# Patient Record
Sex: FEMALE | Race: WHITE | NOT HISPANIC OR LATINO | Employment: OTHER | URBAN - METROPOLITAN AREA
[De-identification: names, ages, dates, MRNs, and addresses within clinical notes are randomized per-mention and may not be internally consistent; named-entity substitution may affect disease eponyms.]

---

## 2023-05-17 PROBLEM — M81.0 OSTEOPOROSIS: Status: ACTIVE | Noted: 2023-05-17

## 2023-05-17 PROBLEM — J45.909 ASTHMA (HHS-HCC): Status: ACTIVE | Noted: 2023-05-17

## 2023-05-17 PROBLEM — H26.9 CATARACT: Status: ACTIVE | Noted: 2023-05-17

## 2023-05-17 PROBLEM — I25.10 CAD (CORONARY ARTERY DISEASE): Status: ACTIVE | Noted: 2023-05-17

## 2023-05-17 PROBLEM — E03.9 HYPOTHYROIDISM: Status: ACTIVE | Noted: 2023-05-17

## 2023-05-17 PROBLEM — I42.2 HYPERTROPHIC CARDIOMYOPATHY (MULTI): Status: ACTIVE | Noted: 2023-05-17

## 2023-05-17 PROBLEM — R73.01 ELEVATED FASTING GLUCOSE: Status: ACTIVE | Noted: 2023-05-17

## 2023-05-17 PROBLEM — K21.9 ACID REFLUX: Status: ACTIVE | Noted: 2023-05-17

## 2023-05-17 PROBLEM — S62.102A CLOSED FRACTURE OF LEFT WRIST: Status: RESOLVED | Noted: 2023-05-17 | Resolved: 2023-05-17

## 2023-05-17 PROBLEM — M25.632 STIFFNESS OF LEFT WRIST, NOT ELSEWHERE CLASSIFIED: Status: ACTIVE | Noted: 2023-05-17

## 2023-05-17 PROBLEM — E78.5 HYPERLIPIDEMIA: Status: ACTIVE | Noted: 2023-05-17

## 2023-05-17 PROBLEM — I10 HTN (HYPERTENSION): Status: ACTIVE | Noted: 2023-05-17

## 2023-05-17 PROBLEM — R73.03 PREDIABETES: Status: ACTIVE | Noted: 2023-05-17

## 2023-05-17 PROBLEM — J44.9 COPD (CHRONIC OBSTRUCTIVE PULMONARY DISEASE) (MULTI): Status: ACTIVE | Noted: 2023-05-17

## 2023-05-17 RX ORDER — MULTIVIT-MIN/IRON/FOLIC ACID/K 18-600-40
1 CAPSULE ORAL DAILY
COMMUNITY
Start: 2022-05-24

## 2023-05-17 RX ORDER — AMLODIPINE BESYLATE 10 MG/1
1 TABLET ORAL DAILY
COMMUNITY
Start: 2022-05-24 | End: 2023-06-19

## 2023-05-17 RX ORDER — ALENDRONATE SODIUM 70 MG/1
TABLET ORAL WEEKLY
COMMUNITY
Start: 2022-05-24

## 2023-05-17 RX ORDER — ATORVASTATIN CALCIUM 40 MG/1
1 TABLET, FILM COATED ORAL DAILY
COMMUNITY
Start: 2022-05-24 | End: 2023-06-19

## 2023-05-17 RX ORDER — METOPROLOL SUCCINATE 100 MG/1
1 TABLET, EXTENDED RELEASE ORAL DAILY
COMMUNITY
Start: 2022-05-24 | End: 2023-06-19

## 2023-05-17 RX ORDER — ACETAMINOPHEN 500 MG
50 TABLET ORAL DAILY
COMMUNITY
Start: 2022-05-24

## 2023-05-17 RX ORDER — NITROGLYCERIN 0.4 MG/1
TABLET SUBLINGUAL
COMMUNITY
Start: 2022-05-24

## 2023-05-17 RX ORDER — ZINC GLUCONATE 50 MG
1 TABLET ORAL DAILY
COMMUNITY
Start: 2022-05-24

## 2023-05-17 RX ORDER — ALBUTEROL SULFATE 90 UG/1
AEROSOL, METERED RESPIRATORY (INHALATION)
COMMUNITY
Start: 2022-05-24 | End: 2023-12-07 | Stop reason: SDUPTHER

## 2023-05-17 RX ORDER — HYDROCHLOROTHIAZIDE 25 MG/1
1 TABLET ORAL DAILY
COMMUNITY
Start: 2022-05-24

## 2023-05-17 RX ORDER — LEVOTHYROXINE SODIUM 50 UG/1
1 TABLET ORAL DAILY
COMMUNITY
Start: 2022-05-24 | End: 2023-10-20 | Stop reason: SDUPTHER

## 2023-05-17 RX ORDER — ASPIRIN 81 MG/1
1 TABLET ORAL DAILY
COMMUNITY
Start: 2022-05-24

## 2023-05-17 RX ORDER — POTASSIUM CHLORIDE 750 MG/1
1 TABLET, FILM COATED, EXTENDED RELEASE ORAL DAILY
COMMUNITY
Start: 2022-05-24 | End: 2023-06-08 | Stop reason: SDUPTHER

## 2023-05-17 RX ORDER — OMEPRAZOLE 20 MG/1
1 CAPSULE, DELAYED RELEASE ORAL DAILY
COMMUNITY
Start: 2022-05-24

## 2023-06-01 ENCOUNTER — OFFICE VISIT (OUTPATIENT)
Dept: PRIMARY CARE | Facility: CLINIC | Age: 83
End: 2023-06-01
Payer: MEDICARE

## 2023-06-01 VITALS
HEIGHT: 63 IN | WEIGHT: 164 LBS | SYSTOLIC BLOOD PRESSURE: 124 MMHG | RESPIRATION RATE: 20 BRPM | OXYGEN SATURATION: 90 % | BODY MASS INDEX: 29.06 KG/M2 | HEART RATE: 85 BPM | DIASTOLIC BLOOD PRESSURE: 60 MMHG

## 2023-06-01 DIAGNOSIS — E78.2 MIXED HYPERLIPIDEMIA: ICD-10-CM

## 2023-06-01 DIAGNOSIS — Z00.00 ROUTINE GENERAL MEDICAL EXAMINATION AT HEALTH CARE FACILITY: Primary | ICD-10-CM

## 2023-06-01 DIAGNOSIS — E03.9 HYPOTHYROIDISM, UNSPECIFIED TYPE: ICD-10-CM

## 2023-06-01 DIAGNOSIS — Z13.29 SCREENING FOR HYPOTHYROIDISM: ICD-10-CM

## 2023-06-01 DIAGNOSIS — I10 BENIGN ESSENTIAL HTN: ICD-10-CM

## 2023-06-01 DIAGNOSIS — I42.2 HYPERTROPHIC CARDIOMYOPATHY (MULTI): ICD-10-CM

## 2023-06-01 DIAGNOSIS — E53.8 VITAMIN B 12 DEFICIENCY: ICD-10-CM

## 2023-06-01 DIAGNOSIS — F17.210 CIGARETTE SMOKER: ICD-10-CM

## 2023-06-01 DIAGNOSIS — R73.03 PREDIABETES: ICD-10-CM

## 2023-06-01 DIAGNOSIS — F51.01 PRIMARY INSOMNIA: ICD-10-CM

## 2023-06-01 DIAGNOSIS — J44.9 CHRONIC OBSTRUCTIVE PULMONARY DISEASE, UNSPECIFIED COPD TYPE (MULTI): ICD-10-CM

## 2023-06-01 DIAGNOSIS — E55.9 VITAMIN D DEFICIENCY: ICD-10-CM

## 2023-06-01 DIAGNOSIS — R73.01 ELEVATED FASTING GLUCOSE: ICD-10-CM

## 2023-06-01 PROBLEM — F41.9 ANXIETY: Status: ACTIVE | Noted: 2023-06-01

## 2023-06-01 PROBLEM — E03.8 OTHER SPECIFIED HYPOTHYROIDISM: Status: ACTIVE | Noted: 2021-11-24

## 2023-06-01 PROBLEM — E03.8 OTHER SPECIFIED HYPOTHYROIDISM: Status: RESOLVED | Noted: 2021-11-24 | Resolved: 2023-06-01

## 2023-06-01 PROCEDURE — 3074F SYST BP LT 130 MM HG: CPT

## 2023-06-01 PROCEDURE — 1159F MED LIST DOCD IN RCRD: CPT

## 2023-06-01 PROCEDURE — 1160F RVW MEDS BY RX/DR IN RCRD: CPT

## 2023-06-01 PROCEDURE — 1170F FXNL STATUS ASSESSED: CPT

## 2023-06-01 PROCEDURE — 99406 BEHAV CHNG SMOKING 3-10 MIN: CPT

## 2023-06-01 PROCEDURE — 99214 OFFICE O/P EST MOD 30 MIN: CPT

## 2023-06-01 PROCEDURE — 3078F DIAST BP <80 MM HG: CPT

## 2023-06-01 PROCEDURE — G0439 PPPS, SUBSEQ VISIT: HCPCS

## 2023-06-01 PROCEDURE — 4004F PT TOBACCO SCREEN RCVD TLK: CPT

## 2023-06-01 RX ORDER — DIPHENHYDRAMINE HCL 50 MG
50 CAPSULE ORAL NIGHTLY PRN
Qty: 90 CAPSULE | Refills: 0 | Status: SHIPPED | OUTPATIENT
Start: 2023-06-01 | End: 2023-08-30

## 2023-06-01 ASSESSMENT — PATIENT HEALTH QUESTIONNAIRE - PHQ9
2. FEELING DOWN, DEPRESSED OR HOPELESS: NOT AT ALL
1. LITTLE INTEREST OR PLEASURE IN DOING THINGS: NOT AT ALL
SUM OF ALL RESPONSES TO PHQ9 QUESTIONS 1 AND 2: 0

## 2023-06-01 ASSESSMENT — ENCOUNTER SYMPTOMS
CONSTITUTIONAL NEGATIVE: 1
PSYCHIATRIC NEGATIVE: 1
HEMATOLOGIC/LYMPHATIC NEGATIVE: 1
MUSCULOSKELETAL NEGATIVE: 1
EYES NEGATIVE: 1
GASTROINTESTINAL NEGATIVE: 1
CARDIOVASCULAR NEGATIVE: 1
ALLERGIC/IMMUNOLOGIC NEGATIVE: 1
RESPIRATORY NEGATIVE: 1
NEUROLOGICAL NEGATIVE: 1
ENDOCRINE NEGATIVE: 1

## 2023-06-01 ASSESSMENT — ACTIVITIES OF DAILY LIVING (ADL)
DRESSING: INDEPENDENT
DOING_HOUSEWORK: INDEPENDENT
GROCERY_SHOPPING: INDEPENDENT
MANAGING_FINANCES: INDEPENDENT
BATHING: INDEPENDENT
TAKING_MEDICATION: INDEPENDENT

## 2023-06-01 NOTE — PROGRESS NOTES
Subjective   Reason for Visit: Shirley Newberry is an 82 y.o. female here for a Medicare Wellness visit.     Past Medical, Surgical, and Family History reviewed and updated in chart.    Reviewed all medications by prescribing practitioner or clinical pharmacist (such as prescriptions, OTCs, herbal therapies and supplements) and documented in the medical record.    HPI an 82-year-old female with past medical history of cardiovascular disease, COPD due to current cigarette smoking about 3 cigarettes a day, hypertension, hypertrophic cardiomyopathy, osteoporosis, vitamin deficiency, prediabetes, hypothyroidism, and hyperlipidemia arrives to the clinic with chief complaint of Medicare wellness exam.  At her last appointment, she was given directions to complete blood work.  She was also given a prescription for hydroxyzine 25 mg oral tablet daily for anxiety and insomnia.  She reports that the hydroxyzine has not worked and that she was unaware of completing blood work.  She recently saw her cardiologist for chest pain at the [facility].  An echocardiogram was completed that showed an ejection fraction of 84% with no acute ischemia.  She states that her chest pain has gone away since then.  Other than this, the patient denies any chest pain, shortness of breath, diarrhea, fevers, chills, head pain, COVID-like symptoms.    Patient Care Team:  DEMETRIO Mcqueen-RADHA as PCP - General  Uday Clay DO as PCP - [employer] PCP     Review of Systems   Constitutional: Negative.    HENT: Negative.     Eyes: Negative.    Respiratory: Negative.     Cardiovascular: Negative.    Gastrointestinal: Negative.    Endocrine: Negative.    Genitourinary: Negative.    Musculoskeletal: Negative.    Skin: Negative.    Allergic/Immunologic: Negative.    Neurological: Negative.    Hematological: Negative.    Psychiatric/Behavioral: Negative.     All other systems reviewed and are negative.      Objective  "  Vitals:  /60 (BP Location: Right arm, BP Cuff Size: Large adult)   Pulse 85   Resp 20   Ht 1.6 m (5' 3\")   Wt 74.4 kg (164 lb)   SpO2 90%   BMI 29.05 kg/m²       Physical Exam  Vitals and nursing note reviewed.   Constitutional:       Appearance: Normal appearance.   HENT:      Head: Normocephalic and atraumatic.      Right Ear: Tympanic membrane normal.      Left Ear: Tympanic membrane normal.      Nose: Nose normal.      Mouth/Throat:      Mouth: Mucous membranes are moist.      Pharynx: Oropharynx is clear.   Eyes:      Extraocular Movements: Extraocular movements intact.      Conjunctiva/sclera: Conjunctivae normal.      Pupils: Pupils are equal, round, and reactive to light.   Cardiovascular:      Rate and Rhythm: Normal rate and regular rhythm.   Pulmonary:      Effort: Pulmonary effort is normal.      Breath sounds: Normal breath sounds.   Abdominal:      General: Bowel sounds are normal.      Palpations: Abdomen is soft.   Musculoskeletal:         General: Normal range of motion.      Cervical back: Normal range of motion and neck supple.   Skin:     General: Skin is warm.      Capillary Refill: Capillary refill takes less than 2 seconds.   Neurological:      General: No focal deficit present.      Mental Status: She is alert and oriented to person, place, and time. Mental status is at baseline.   Psychiatric:         Mood and Affect: Mood normal.         Behavior: Behavior normal.         Thought Content: Thought content normal.         Judgment: Judgment normal.         Assessment/Plan   Problem List Items Addressed This Visit          Respiratory    COPD (chronic obstructive pulmonary disease) (CMS/HCC)    Relevant Orders    Follow Up In [facility] - PCP       Circulatory    Hypertrophic cardiomyopathy (CMS/HCC)       Other    Elevated fasting glucose    Relevant Orders    Comprehensive Metabolic Panel    CBC    Hemoglobin A1C    Follow Up In [facility] - PCP    " Hyperlipidemia    Relevant Orders    Lipid Panel    Follow Up In [facility] - PCP     Other Visit Diagnoses       Routine general medical examination at health care facility    -  Primary    Relevant Orders    Follow Up In [facility] - PCP    Vitamin B 12 deficiency        Relevant Orders    Vitamin D, Total    Follow Up In [facility] - PCP    Vitamin D deficiency        Relevant Orders    Vitamin D, Total    Vitamin B12    Follow Up In [facility] - PCP    Screening for hypothyroidism        Relevant Orders    TSH with reflex to Free T4 if abnormal    Follow Up In [facility] - PCP    Primary insomnia        Relevant Medications    diphenhydrAMINE (Unisom SleepGels) 50 mg capsule    Other Relevant Orders    Follow Up In [facility] - PCP    Cigarette smoker        Relevant Orders    Follow Up In [facility] - PCP            It was a pleasure seeing you in the office today.    Your blood pressure is controlled.  He has no recurrent symptoms of chest pain.  I do recommend that you follow-up with cardiologist at the [facility] for further evaluation and health maintenance.    In regards to your insomnia: The medication that you are referring to that your neighbor takes is a controlled sleep aid.  This is a highly addictive medication.  I do believe that you do not need this medication given your past medical history and current medication regimen.  I did send over the counter Unisom to your pharmacy.  Please let us know if this works.    I did order blood work for you to complete prior to your next appointment.  These labs require you to fast.  Your next appointment will be in 3 months.    You have no additional questions in regards to your daily medications.    We have discussed screening exams in the office today.  You are not interested in the CT cardiac scoring or the low-dose CT of the lungs.  Bone density scan was completed last year.   Repeat bone density in 2024.  Digital mammogram was completed beginning of this year.  Repeat mammogram in 1 year.    Cigarette smoking cessation counselling for 4 minutes. Patient will focus on stopping  to help decrease risk of developing cancers, respiratory, and cardiovascular diseases.    Anticipatory guidance, age appropriate vaccines, screening exams, health promotion and prevention discussed.    I also advised you to follow low fat diet and exercise for at least 30 minutes daily.    This document was generated using the assistance of voice recognition software. If there are any errors of spelling, grammar, syntax, or meaning; please feel free to contact me directly for clarification.

## 2023-06-01 NOTE — PROGRESS NOTES
Medicare well exam.    The med you gave her for sleeping / anxiety - does not work. I do not see the med on her chart and she does not know the name of it. She is asking to try belsomra or something like that.

## 2023-06-02 ENCOUNTER — TELEPHONE (OUTPATIENT)
Dept: PRIMARY CARE | Facility: CLINIC | Age: 83
End: 2023-06-02
Payer: MEDICARE

## 2023-06-02 NOTE — TELEPHONE ENCOUNTER
Patient called in stating that she was on a plan for 18 hrs and he ears fill plugged. Patient wants advise on what she can do her self to help pop her ears.

## 2023-06-07 ENCOUNTER — TELEPHONE (OUTPATIENT)
Dept: PRIMARY CARE | Facility: CLINIC | Age: 83
End: 2023-06-07

## 2023-06-07 ENCOUNTER — LAB (OUTPATIENT)
Dept: LAB | Facility: LAB | Age: 83
End: 2023-06-07
Payer: MEDICARE

## 2023-06-07 DIAGNOSIS — E78.2 MIXED HYPERLIPIDEMIA: ICD-10-CM

## 2023-06-07 DIAGNOSIS — E87.6 HYPOKALEMIA: Primary | ICD-10-CM

## 2023-06-07 DIAGNOSIS — Z13.29 SCREENING FOR HYPOTHYROIDISM: ICD-10-CM

## 2023-06-07 DIAGNOSIS — E55.9 VITAMIN D DEFICIENCY: ICD-10-CM

## 2023-06-07 DIAGNOSIS — R73.01 ELEVATED FASTING GLUCOSE: ICD-10-CM

## 2023-06-07 DIAGNOSIS — E53.8 VITAMIN B 12 DEFICIENCY: ICD-10-CM

## 2023-06-07 LAB
ALANINE AMINOTRANSFERASE (SGPT) (U/L) IN SER/PLAS: 22 U/L (ref 7–45)
ALBUMIN (G/DL) IN SER/PLAS: 3.7 G/DL (ref 3.4–5)
ALKALINE PHOSPHATASE (U/L) IN SER/PLAS: 73 U/L (ref 33–136)
ANION GAP IN SER/PLAS: 11 MMOL/L (ref 10–20)
ASPARTATE AMINOTRANSFERASE (SGOT) (U/L) IN SER/PLAS: 22 U/L (ref 9–39)
BILIRUBIN TOTAL (MG/DL) IN SER/PLAS: 0.6 MG/DL (ref 0–1.2)
CALCIDIOL (25 OH VITAMIN D3) (NG/ML) IN SER/PLAS: 49 NG/ML
CALCIUM (MG/DL) IN SER/PLAS: 9.2 MG/DL (ref 8.6–10.3)
CARBON DIOXIDE, TOTAL (MMOL/L) IN SER/PLAS: 31 MMOL/L (ref 21–32)
CHLORIDE (MMOL/L) IN SER/PLAS: 103 MMOL/L (ref 98–107)
CHOLESTEROL (MG/DL) IN SER/PLAS: 113 MG/DL (ref 0–199)
CHOLESTEROL IN HDL (MG/DL) IN SER/PLAS: 26.4 MG/DL
CHOLESTEROL/HDL RATIO: 4.3
COBALAMIN (VITAMIN B12) (PG/ML) IN SER/PLAS: 587 PG/ML (ref 211–911)
CREATININE (MG/DL) IN SER/PLAS: 0.86 MG/DL (ref 0.5–1.05)
ERYTHROCYTE DISTRIBUTION WIDTH (RATIO) BY AUTOMATED COUNT: 14.1 % (ref 11.5–14.5)
ERYTHROCYTE MEAN CORPUSCULAR HEMOGLOBIN CONCENTRATION (G/DL) BY AUTOMATED: 32.1 G/DL (ref 32–36)
ERYTHROCYTE MEAN CORPUSCULAR VOLUME (FL) BY AUTOMATED COUNT: 92 FL (ref 80–100)
ERYTHROCYTES (10*6/UL) IN BLOOD BY AUTOMATED COUNT: 5.19 X10E12/L (ref 4–5.2)
ESTIMATED AVERAGE GLUCOSE FOR HBA1C: 131 MG/DL
GFR FEMALE: 68 ML/MIN/1.73M2
GLUCOSE (MG/DL) IN SER/PLAS: 95 MG/DL (ref 74–99)
HEMATOCRIT (%) IN BLOOD BY AUTOMATED COUNT: 47.7 % (ref 36–46)
HEMOGLOBIN (G/DL) IN BLOOD: 15.3 G/DL (ref 12–16)
HEMOGLOBIN A1C/HEMOGLOBIN TOTAL IN BLOOD: 6.2 %
LDL: 56 MG/DL (ref 0–99)
LEUKOCYTES (10*3/UL) IN BLOOD BY AUTOMATED COUNT: 9 X10E9/L (ref 4.4–11.3)
PLATELETS (10*3/UL) IN BLOOD AUTOMATED COUNT: 215 X10E9/L (ref 150–450)
POTASSIUM (MMOL/L) IN SER/PLAS: 3.3 MMOL/L (ref 3.5–5.3)
PROTEIN TOTAL: 6.2 G/DL (ref 6.4–8.2)
SODIUM (MMOL/L) IN SER/PLAS: 142 MMOL/L (ref 136–145)
THYROTROPIN (MIU/L) IN SER/PLAS BY DETECTION LIMIT <= 0.05 MIU/L: 2.57 MIU/L (ref 0.44–3.98)
TRIGLYCERIDE (MG/DL) IN SER/PLAS: 154 MG/DL (ref 0–149)
UREA NITROGEN (MG/DL) IN SER/PLAS: 15 MG/DL (ref 6–23)
VLDL: 31 MG/DL (ref 0–40)

## 2023-06-07 PROCEDURE — 82306 VITAMIN D 25 HYDROXY: CPT

## 2023-06-07 PROCEDURE — 80061 LIPID PANEL: CPT

## 2023-06-07 PROCEDURE — 82607 VITAMIN B-12: CPT

## 2023-06-07 PROCEDURE — 83036 HEMOGLOBIN GLYCOSYLATED A1C: CPT

## 2023-06-07 PROCEDURE — 36415 COLL VENOUS BLD VENIPUNCTURE: CPT

## 2023-06-07 PROCEDURE — 80053 COMPREHEN METABOLIC PANEL: CPT

## 2023-06-07 PROCEDURE — 84443 ASSAY THYROID STIM HORMONE: CPT

## 2023-06-07 PROCEDURE — 85027 COMPLETE CBC AUTOMATED: CPT

## 2023-06-07 NOTE — TELEPHONE ENCOUNTER
Please notify the patient that he potassium is at 3.3 which is low. It looks like she is on potassium 10meq daily. If she is taking that daily, tell her to increase it to 20meq daily. If she is not taking any potassium 10meq daily, let me know and I will send in that rx. Repeat CMP blood order 1 week before I see her again. thanks

## 2023-06-08 RX ORDER — POTASSIUM CHLORIDE 750 MG/1
10 TABLET, FILM COATED, EXTENDED RELEASE ORAL DAILY
Qty: 90 TABLET | Refills: 0 | Status: SHIPPED | OUTPATIENT
Start: 2023-06-08 | End: 2023-08-10 | Stop reason: SDUPTHER

## 2023-06-08 NOTE — TELEPHONE ENCOUNTER
Shirley called back--She is not taking any Potassium right now --she needs refill sent in   She wants it to go to [facility]  mail order pharmacy

## 2023-06-19 DIAGNOSIS — E78.2 MIXED HYPERLIPIDEMIA: Primary | ICD-10-CM

## 2023-06-19 DIAGNOSIS — F41.9 ANXIETY: ICD-10-CM

## 2023-06-19 DIAGNOSIS — I10 BENIGN ESSENTIAL HTN: ICD-10-CM

## 2023-06-19 RX ORDER — AMLODIPINE BESYLATE 10 MG/1
TABLET ORAL
Qty: 90 TABLET | Refills: 2 | Status: SHIPPED | OUTPATIENT
Start: 2023-06-19

## 2023-06-19 RX ORDER — METOPROLOL SUCCINATE 100 MG/1
TABLET, EXTENDED RELEASE ORAL
Qty: 90 TABLET | Refills: 2 | Status: SHIPPED | OUTPATIENT
Start: 2023-06-19

## 2023-06-19 RX ORDER — ATORVASTATIN CALCIUM 40 MG/1
TABLET, FILM COATED ORAL
Qty: 90 TABLET | Refills: 2 | Status: SHIPPED | OUTPATIENT
Start: 2023-06-19

## 2023-06-19 RX ORDER — HYDROXYZINE HYDROCHLORIDE 25 MG/1
TABLET, FILM COATED ORAL
Qty: 90 TABLET | Refills: 2 | Status: SHIPPED | OUTPATIENT
Start: 2023-06-19

## 2023-07-26 ENCOUNTER — TELEPHONE (OUTPATIENT)
Dept: PRIMARY CARE | Facility: CLINIC | Age: 83
End: 2023-07-26
Payer: MEDICARE

## 2023-07-26 NOTE — TELEPHONE ENCOUNTER
Patient received the letter from  that you are leaving and would like to know from you who you would think she should follow up with   Please call [phone]

## 2023-08-09 ENCOUNTER — LAB (OUTPATIENT)
Dept: LAB | Facility: LAB | Age: 83
End: 2023-08-09
Payer: MEDICARE

## 2023-08-09 ENCOUNTER — TELEPHONE (OUTPATIENT)
Dept: PRIMARY CARE | Facility: CLINIC | Age: 83
End: 2023-08-09

## 2023-08-09 ENCOUNTER — OFFICE VISIT (OUTPATIENT)
Dept: PRIMARY CARE | Facility: CLINIC | Age: 83
End: 2023-08-09
Payer: MEDICARE

## 2023-08-09 VITALS
BODY MASS INDEX: 29.34 KG/M2 | HEART RATE: 74 BPM | DIASTOLIC BLOOD PRESSURE: 74 MMHG | OXYGEN SATURATION: 97 % | WEIGHT: 165.6 LBS | RESPIRATION RATE: 16 BRPM | HEIGHT: 63 IN | SYSTOLIC BLOOD PRESSURE: 118 MMHG

## 2023-08-09 DIAGNOSIS — F51.01 PRIMARY INSOMNIA: ICD-10-CM

## 2023-08-09 DIAGNOSIS — E87.6 HYPOKALEMIA: ICD-10-CM

## 2023-08-09 DIAGNOSIS — Z00.00 ROUTINE GENERAL MEDICAL EXAMINATION AT HEALTH CARE FACILITY: ICD-10-CM

## 2023-08-09 DIAGNOSIS — E87.6 HYPOKALEMIA: Primary | ICD-10-CM

## 2023-08-09 LAB
ALANINE AMINOTRANSFERASE (SGPT) (U/L) IN SER/PLAS: 14 U/L (ref 7–45)
ALBUMIN (G/DL) IN SER/PLAS: 4 G/DL (ref 3.4–5)
ALKALINE PHOSPHATASE (U/L) IN SER/PLAS: 80 U/L (ref 33–136)
ANION GAP IN SER/PLAS: 11 MMOL/L (ref 10–20)
ASPARTATE AMINOTRANSFERASE (SGOT) (U/L) IN SER/PLAS: 14 U/L (ref 9–39)
BILIRUBIN TOTAL (MG/DL) IN SER/PLAS: 0.3 MG/DL (ref 0–1.2)
CALCIUM (MG/DL) IN SER/PLAS: 9.1 MG/DL (ref 8.6–10.3)
CARBON DIOXIDE, TOTAL (MMOL/L) IN SER/PLAS: 28 MMOL/L (ref 21–32)
CHLORIDE (MMOL/L) IN SER/PLAS: 106 MMOL/L (ref 98–107)
CREATININE (MG/DL) IN SER/PLAS: 0.88 MG/DL (ref 0.5–1.05)
GFR FEMALE: 66 ML/MIN/1.73M2
GLUCOSE (MG/DL) IN SER/PLAS: 88 MG/DL (ref 74–99)
POTASSIUM (MMOL/L) IN SER/PLAS: 3.4 MMOL/L (ref 3.5–5.3)
PROTEIN TOTAL: 6.3 G/DL (ref 6.4–8.2)
SODIUM (MMOL/L) IN SER/PLAS: 142 MMOL/L (ref 136–145)
UREA NITROGEN (MG/DL) IN SER/PLAS: 21 MG/DL (ref 6–23)

## 2023-08-09 PROCEDURE — 1160F RVW MEDS BY RX/DR IN RCRD: CPT

## 2023-08-09 PROCEDURE — 80053 COMPREHEN METABOLIC PANEL: CPT

## 2023-08-09 PROCEDURE — 36415 COLL VENOUS BLD VENIPUNCTURE: CPT

## 2023-08-09 PROCEDURE — 4004F PT TOBACCO SCREEN RCVD TLK: CPT

## 2023-08-09 PROCEDURE — 3074F SYST BP LT 130 MM HG: CPT

## 2023-08-09 PROCEDURE — 99213 OFFICE O/P EST LOW 20 MIN: CPT

## 2023-08-09 PROCEDURE — 1159F MED LIST DOCD IN RCRD: CPT

## 2023-08-09 PROCEDURE — 3078F DIAST BP <80 MM HG: CPT

## 2023-08-09 RX ORDER — TRAZODONE HYDROCHLORIDE 50 MG/1
50 TABLET ORAL NIGHTLY PRN
Qty: 30 TABLET | Refills: 0 | Status: SHIPPED | OUTPATIENT
Start: 2023-08-09 | End: 2023-09-08

## 2023-08-09 ASSESSMENT — ENCOUNTER SYMPTOMS
PSYCHIATRIC NEGATIVE: 1
CONSTITUTIONAL NEGATIVE: 1
RESPIRATORY NEGATIVE: 1
EYES NEGATIVE: 1
LOSS OF SENSATION IN FEET: 0
MUSCULOSKELETAL NEGATIVE: 1
DEPRESSION: 0
HEMATOLOGIC/LYMPHATIC NEGATIVE: 1
GASTROINTESTINAL NEGATIVE: 1
ENDOCRINE NEGATIVE: 1
OCCASIONAL FEELINGS OF UNSTEADINESS: 0
CARDIOVASCULAR NEGATIVE: 1
NEUROLOGICAL NEGATIVE: 1
ALLERGIC/IMMUNOLOGIC NEGATIVE: 1

## 2023-08-09 ASSESSMENT — ANXIETY QUESTIONNAIRES
7. FEELING AFRAID AS IF SOMETHING AWFUL MIGHT HAPPEN: NOT AT ALL
4. TROUBLE RELAXING: NOT AT ALL
2. NOT BEING ABLE TO STOP OR CONTROL WORRYING: NOT AT ALL
GAD7 TOTAL SCORE: 0
3. WORRYING TOO MUCH ABOUT DIFFERENT THINGS: NOT AT ALL
5. BEING SO RESTLESS THAT IT IS HARD TO SIT STILL: NOT AT ALL
IF YOU CHECKED OFF ANY PROBLEMS ON THIS QUESTIONNAIRE, HOW DIFFICULT HAVE THESE PROBLEMS MADE IT FOR YOU TO DO YOUR WORK, TAKE CARE OF THINGS AT HOME, OR GET ALONG WITH OTHER PEOPLE: NOT DIFFICULT AT ALL
1. FEELING NERVOUS, ANXIOUS, OR ON EDGE: NOT AT ALL
6. BECOMING EASILY ANNOYED OR IRRITABLE: NOT AT ALL

## 2023-08-09 ASSESSMENT — PATIENT HEALTH QUESTIONNAIRE - PHQ9
SUM OF ALL RESPONSES TO PHQ9 QUESTIONS 1 AND 2: 0
2. FEELING DOWN, DEPRESSED OR HOPELESS: NOT AT ALL
1. LITTLE INTEREST OR PLEASURE IN DOING THINGS: NOT AT ALL

## 2023-08-09 NOTE — PATIENT INSTRUCTIONS
Call before August 22nd to discuss Trazadone 50mg nightly for sleep.    Get blood work done today. Will call with results.    6 month follow up with Poornima Servin CNP.

## 2023-08-09 NOTE — PROGRESS NOTES
"Subjective   Patient ID: Shirley Newberry is a 82 y.o. female who presents for Follow-up.    HPI an 82-year-old female with past medical history of cardiovascular disease, COPD due to current cigarette smoking about 3 cigarettes a day, hypertension, hypertrophic cardiomyopathy, osteoporosis, vitamin deficiency, prediabetes, hypothyroidism, and hyperlipidemia arrives to the clinic with chief complaint of 3 month follow up. She recently saw her cardiologist for chest pain at the [facility] prior to the last 2 encounters here. An echocardiogram was completed that showed an ejection fraction of 84% with no acute ischemia.  She had recent lab work done completed a month ago that showed hypokalemia.  She was given instructions to repeat a CMP prior to this encounter to begin taking potassium chloride supplements 20 mEq per day.  She reports that she forgot to get her blood work done.  She states she is going to get her blood work done today.  She also reports that the hydroxyzine and Unisom did not work for her insomnia.  She is wonder if she can get anything prescribed.  Other than this, the patient denies any chest pain, shortness of breath, diarrhea, fevers, chills, head pain, COVID-like symptoms.     Review of Systems   Constitutional: Negative.    HENT: Negative.     Eyes: Negative.    Respiratory: Negative.     Cardiovascular: Negative.    Gastrointestinal: Negative.    Endocrine: Negative.    Genitourinary: Negative.    Musculoskeletal: Negative.    Skin: Negative.    Allergic/Immunologic: Negative.    Neurological: Negative.    Hematological: Negative.    Psychiatric/Behavioral: Negative.     All other systems reviewed and are negative.      Objective   /74   Pulse 74   Resp 16   Ht 1.6 m (5' 3\")   Wt 75.1 kg (165 lb 9.6 oz)   SpO2 97%   BMI 29.33 kg/m²     Physical Exam  Vitals and nursing note reviewed.   Constitutional:       Appearance: Normal appearance.   HENT:      Head: Normocephalic and " atraumatic.      Right Ear: Tympanic membrane normal.      Left Ear: Tympanic membrane normal.      Nose: Nose normal.      Mouth/Throat:      Mouth: Mucous membranes are moist.      Pharynx: Oropharynx is clear.   Eyes:      Extraocular Movements: Extraocular movements intact.      Conjunctiva/sclera: Conjunctivae normal.      Pupils: Pupils are equal, round, and reactive to light.   Cardiovascular:      Rate and Rhythm: Normal rate and regular rhythm.   Pulmonary:      Effort: Pulmonary effort is normal.      Breath sounds: Normal breath sounds.   Abdominal:      General: Bowel sounds are normal.      Palpations: Abdomen is soft.   Musculoskeletal:         General: Normal range of motion.      Cervical back: Normal range of motion and neck supple.   Skin:     General: Skin is warm.      Capillary Refill: Capillary refill takes less than 2 seconds.   Neurological:      General: No focal deficit present.      Mental Status: She is alert and oriented to person, place, and time. Mental status is at baseline.   Psychiatric:         Mood and Affect: Mood normal.         Behavior: Behavior normal.         Thought Content: Thought content normal.         Judgment: Judgment normal.         Assessment/Plan   Problem List Items Addressed This Visit       Hypokalemia - Primary    Primary insomnia    Relevant Medications    traZODone (Desyrel) 50 mg tablet    Other Relevant Orders    Follow Up In [facility] - PCP - Established     Other Visit Diagnoses       Routine general medical examination at health care facility        Relevant Orders    Follow Up In [facility] - PCP - Established          It was a pleasure seeing you in the office today.    Please complete the CMP that was ordered last month.  I will call you with any abnormal results.    In regards to your insomnia, you have failed Unisom and hydroxyzine.  Please begin taking trazodone 50 mg oral tablet nightly.  We have discussed potential side  effects and risks of taking trazodone at night.  Please ensure that you are changing positions slowly as this medication can cause drowsiness and dizziness.  Please call the office back in a couple weeks for medication effectiveness and efficiency.    Continue all of your medications as prescribed as they are working to control your symptoms.    Follow-up in 6 months with Poornima GARCIA.    I also advised you to follow low fat diet and exercise for at least 30 minutes daily.    Anticipatory guidance, age appropriate vaccines, screening exams, health promotion and prevention discussed.    This document was generated using the assistance of voice recognition software. If there are any errors of spelling, grammar, syntax, or meaning; please feel free to contact me directly for clarification.

## 2023-08-10 RX ORDER — POTASSIUM CHLORIDE 1500 MG/1
20 TABLET, EXTENDED RELEASE ORAL DAILY
Qty: 90 TABLET | Refills: 0 | Status: SHIPPED | OUTPATIENT
Start: 2023-08-10 | End: 2023-09-26 | Stop reason: SDUPTHER

## 2023-08-19 ENCOUNTER — LAB (OUTPATIENT)
Dept: LAB | Facility: LAB | Age: 83
End: 2023-08-19
Payer: MEDICARE

## 2023-08-19 DIAGNOSIS — E87.6 HYPOKALEMIA: ICD-10-CM

## 2023-08-19 LAB
ALANINE AMINOTRANSFERASE (SGPT) (U/L) IN SER/PLAS: 13 U/L (ref 7–45)
ALBUMIN (G/DL) IN SER/PLAS: 4.2 G/DL (ref 3.4–5)
ALKALINE PHOSPHATASE (U/L) IN SER/PLAS: 82 U/L (ref 33–136)
ANION GAP IN SER/PLAS: 10 MMOL/L (ref 10–20)
ASPARTATE AMINOTRANSFERASE (SGOT) (U/L) IN SER/PLAS: 12 U/L (ref 9–39)
BILIRUBIN TOTAL (MG/DL) IN SER/PLAS: 0.6 MG/DL (ref 0–1.2)
CALCIUM (MG/DL) IN SER/PLAS: 9.5 MG/DL (ref 8.6–10.3)
CARBON DIOXIDE, TOTAL (MMOL/L) IN SER/PLAS: 30 MMOL/L (ref 21–32)
CHLORIDE (MMOL/L) IN SER/PLAS: 107 MMOL/L (ref 98–107)
CREATININE (MG/DL) IN SER/PLAS: 0.71 MG/DL (ref 0.5–1.05)
GFR FEMALE: 85 ML/MIN/1.73M2
GLUCOSE (MG/DL) IN SER/PLAS: 101 MG/DL (ref 74–99)
POTASSIUM (MMOL/L) IN SER/PLAS: 4.1 MMOL/L (ref 3.5–5.3)
PROTEIN TOTAL: 6.6 G/DL (ref 6.4–8.2)
SODIUM (MMOL/L) IN SER/PLAS: 143 MMOL/L (ref 136–145)
UREA NITROGEN (MG/DL) IN SER/PLAS: 12 MG/DL (ref 6–23)

## 2023-08-19 PROCEDURE — 80053 COMPREHEN METABOLIC PANEL: CPT

## 2023-08-19 PROCEDURE — 36415 COLL VENOUS BLD VENIPUNCTURE: CPT

## 2023-08-21 ENCOUNTER — TELEPHONE (OUTPATIENT)
Dept: PRIMARY CARE | Facility: CLINIC | Age: 83
End: 2023-08-21
Payer: MEDICARE

## 2023-08-21 DIAGNOSIS — E87.6 HYPOKALEMIA: Primary | ICD-10-CM

## 2023-08-22 NOTE — TELEPHONE ENCOUNTER
Potassium levels are normal again at 4.1     In 1 more month, please repeat 1 more CMP to ensure it is normal.     Continue potassium 20meq daily until next blood draw. Thanks

## 2023-09-01 ENCOUNTER — APPOINTMENT (OUTPATIENT)
Dept: PRIMARY CARE | Facility: CLINIC | Age: 83
End: 2023-09-01
Payer: MEDICARE

## 2023-09-20 ENCOUNTER — LAB (OUTPATIENT)
Dept: LAB | Facility: LAB | Age: 83
End: 2023-09-20
Payer: MEDICARE

## 2023-09-20 DIAGNOSIS — E87.6 HYPOKALEMIA: ICD-10-CM

## 2023-09-20 LAB
ALANINE AMINOTRANSFERASE (SGPT) (U/L) IN SER/PLAS: 16 U/L (ref 7–45)
ALBUMIN (G/DL) IN SER/PLAS: 4.1 G/DL (ref 3.4–5)
ALKALINE PHOSPHATASE (U/L) IN SER/PLAS: 81 U/L (ref 33–136)
ANION GAP IN SER/PLAS: 11 MMOL/L (ref 10–20)
ASPARTATE AMINOTRANSFERASE (SGOT) (U/L) IN SER/PLAS: 14 U/L (ref 9–39)
BILIRUBIN TOTAL (MG/DL) IN SER/PLAS: 0.6 MG/DL (ref 0–1.2)
CALCIUM (MG/DL) IN SER/PLAS: 9.4 MG/DL (ref 8.6–10.3)
CARBON DIOXIDE, TOTAL (MMOL/L) IN SER/PLAS: 29 MMOL/L (ref 21–32)
CHLORIDE (MMOL/L) IN SER/PLAS: 105 MMOL/L (ref 98–107)
CREATININE (MG/DL) IN SER/PLAS: 0.86 MG/DL (ref 0.5–1.05)
GFR FEMALE: 68 ML/MIN/1.73M2
GLUCOSE (MG/DL) IN SER/PLAS: 102 MG/DL (ref 74–99)
POTASSIUM (MMOL/L) IN SER/PLAS: 3.4 MMOL/L (ref 3.5–5.3)
PROTEIN TOTAL: 6.5 G/DL (ref 6.4–8.2)
SODIUM (MMOL/L) IN SER/PLAS: 142 MMOL/L (ref 136–145)
UREA NITROGEN (MG/DL) IN SER/PLAS: 17 MG/DL (ref 6–23)

## 2023-09-20 PROCEDURE — 36415 COLL VENOUS BLD VENIPUNCTURE: CPT

## 2023-09-20 PROCEDURE — 80053 COMPREHEN METABOLIC PANEL: CPT

## 2023-09-25 ENCOUNTER — TELEPHONE (OUTPATIENT)
Dept: PRIMARY CARE | Facility: CLINIC | Age: 83
End: 2023-09-25
Payer: MEDICARE

## 2023-09-25 DIAGNOSIS — E87.6 HYPOKALEMIA: ICD-10-CM

## 2023-09-25 NOTE — TELEPHONE ENCOUNTER
Inquiring about Potassium results? She also wants to know if she can start taking her Trazodone? PRASAD had stopped it due to her potassium level. She is scheduled to see you in  February

## 2023-09-26 RX ORDER — POTASSIUM CHLORIDE 20 MEQ/1
20 TABLET, EXTENDED RELEASE ORAL 2 TIMES DAILY
Qty: 180 TABLET | Refills: 0 | Status: SHIPPED | OUTPATIENT
Start: 2023-09-26 | End: 2023-12-28

## 2023-09-26 NOTE — TELEPHONE ENCOUNTER
Increase Potassium to twice a day. She is on hydrochlorothiazide that will cause her Potassium to drop.

## 2023-09-26 NOTE — TELEPHONE ENCOUNTER
Has she been taking her Potassium? Her level is low again. Due to her hydrochlorothiazide will need to continue taking the Potassium supplement.

## 2023-09-26 NOTE — TELEPHONE ENCOUNTER
Patient stated she takes her Potassium 20meq everyday, she is asking what causes her Potassium to drop?

## 2023-09-28 ENCOUNTER — TELEPHONE (OUTPATIENT)
Dept: PRIMARY CARE | Facility: CLINIC | Age: 83
End: 2023-09-28
Payer: MEDICARE

## 2023-09-28 NOTE — TELEPHONE ENCOUNTER
Patient calling she is moving and needs note she needs her small bird for stress and anxiety  she has it for 19 years  Faxing form to the office

## 2023-09-29 NOTE — TELEPHONE ENCOUNTER
Poornima has never seen patient before. She is a transfer from . Not scheduled until 2/2024. Are you willing to work in or will you write letter

## 2023-09-29 NOTE — TELEPHONE ENCOUNTER
She would need seen for this. Can try and find an acute spot to address just this issue and then keep the follow up appointment for February. Thank you!

## 2023-10-04 ENCOUNTER — OFFICE VISIT (OUTPATIENT)
Dept: PRIMARY CARE | Facility: CLINIC | Age: 83
End: 2023-10-04
Payer: MEDICARE

## 2023-10-04 ENCOUNTER — LAB (OUTPATIENT)
Dept: LAB | Facility: LAB | Age: 83
End: 2023-10-04
Payer: MEDICARE

## 2023-10-04 ENCOUNTER — TELEPHONE (OUTPATIENT)
Dept: PRIMARY CARE | Facility: CLINIC | Age: 83
End: 2023-10-04

## 2023-10-04 VITALS
WEIGHT: 159 LBS | HEIGHT: 63 IN | SYSTOLIC BLOOD PRESSURE: 134 MMHG | BODY MASS INDEX: 28.17 KG/M2 | DIASTOLIC BLOOD PRESSURE: 70 MMHG | HEART RATE: 62 BPM

## 2023-10-04 DIAGNOSIS — E53.8 VITAMIN B 12 DEFICIENCY: ICD-10-CM

## 2023-10-04 DIAGNOSIS — E03.9 HYPOTHYROIDISM, UNSPECIFIED TYPE: ICD-10-CM

## 2023-10-04 DIAGNOSIS — I10 BENIGN ESSENTIAL HTN: ICD-10-CM

## 2023-10-04 DIAGNOSIS — E87.6 HYPOKALEMIA: ICD-10-CM

## 2023-10-04 DIAGNOSIS — E55.9 VITAMIN D DEFICIENCY: ICD-10-CM

## 2023-10-04 DIAGNOSIS — F41.9 ANXIETY: ICD-10-CM

## 2023-10-04 DIAGNOSIS — F51.01 PRIMARY INSOMNIA: Primary | ICD-10-CM

## 2023-10-04 DIAGNOSIS — Z76.89 ENCOUNTER TO ESTABLISH CARE: ICD-10-CM

## 2023-10-04 DIAGNOSIS — Z23 NEED FOR INFLUENZA VACCINATION: ICD-10-CM

## 2023-10-04 DIAGNOSIS — R73.03 PREDIABETES: ICD-10-CM

## 2023-10-04 DIAGNOSIS — E78.2 MIXED HYPERLIPIDEMIA: ICD-10-CM

## 2023-10-04 LAB
ANION GAP SERPL CALC-SCNC: 14 MMOL/L (ref 10–20)
BUN SERPL-MCNC: 15 MG/DL (ref 6–23)
CALCIUM SERPL-MCNC: 9.3 MG/DL (ref 8.6–10.3)
CHLORIDE SERPL-SCNC: 105 MMOL/L (ref 98–107)
CO2 SERPL-SCNC: 26 MMOL/L (ref 21–32)
CREAT SERPL-MCNC: 0.94 MG/DL (ref 0.5–1.05)
GFR SERPL CREATININE-BSD FRML MDRD: 61 ML/MIN/1.73M*2
GLUCOSE SERPL-MCNC: 103 MG/DL (ref 74–99)
POTASSIUM SERPL-SCNC: 4.1 MMOL/L (ref 3.5–5.3)
SODIUM SERPL-SCNC: 141 MMOL/L (ref 136–145)

## 2023-10-04 PROCEDURE — 1160F RVW MEDS BY RX/DR IN RCRD: CPT | Performed by: CLINICAL NURSE SPECIALIST

## 2023-10-04 PROCEDURE — 36415 COLL VENOUS BLD VENIPUNCTURE: CPT

## 2023-10-04 PROCEDURE — 3075F SYST BP GE 130 - 139MM HG: CPT | Performed by: CLINICAL NURSE SPECIALIST

## 2023-10-04 PROCEDURE — 4004F PT TOBACCO SCREEN RCVD TLK: CPT | Performed by: CLINICAL NURSE SPECIALIST

## 2023-10-04 PROCEDURE — 90662 IIV NO PRSV INCREASED AG IM: CPT | Performed by: CLINICAL NURSE SPECIALIST

## 2023-10-04 PROCEDURE — 99214 OFFICE O/P EST MOD 30 MIN: CPT | Performed by: CLINICAL NURSE SPECIALIST

## 2023-10-04 PROCEDURE — G0008 ADMIN INFLUENZA VIRUS VAC: HCPCS | Performed by: CLINICAL NURSE SPECIALIST

## 2023-10-04 PROCEDURE — 1159F MED LIST DOCD IN RCRD: CPT | Performed by: CLINICAL NURSE SPECIALIST

## 2023-10-04 PROCEDURE — 3078F DIAST BP <80 MM HG: CPT | Performed by: CLINICAL NURSE SPECIALIST

## 2023-10-04 ASSESSMENT — ENCOUNTER SYMPTOMS
SLEEP DISTURBANCE: 0
BLOOD IN STOOL: 0
FEVER: 0
PALPITATIONS: 0
JOINT SWELLING: 0
OCCASIONAL FEELINGS OF UNSTEADINESS: 0
ACTIVITY CHANGE: 0
DEPRESSION: 1
VOMITING: 0
HEADACHES: 0
SHORTNESS OF BREATH: 1
FLANK PAIN: 0
SORE THROAT: 0
NECK PAIN: 0
CONFUSION: 0
HEMATURIA: 0
PHOTOPHOBIA: 0
BRUISES/BLEEDS EASILY: 0
CHILLS: 0
FATIGUE: 0
EYE PAIN: 0
COUGH: 0
UNEXPECTED WEIGHT CHANGE: 0
CHEST TIGHTNESS: 0
WHEEZING: 0
TROUBLE SWALLOWING: 0
LOSS OF SENSATION IN FEET: 0
BACK PAIN: 0
WOUND: 0
POLYDIPSIA: 0
NAUSEA: 0
ABDOMINAL PAIN: 0
SEIZURES: 0
DYSURIA: 0
ARTHRALGIAS: 0
MYALGIAS: 0
CONSTIPATION: 0
DIARRHEA: 0
DIZZINESS: 0
APPETITE CHANGE: 0

## 2023-10-04 ASSESSMENT — COLUMBIA-SUICIDE SEVERITY RATING SCALE - C-SSRS
6. HAVE YOU EVER DONE ANYTHING, STARTED TO DO ANYTHING, OR PREPARED TO DO ANYTHING TO END YOUR LIFE?: NO
1. IN THE PAST MONTH, HAVE YOU WISHED YOU WERE DEAD OR WISHED YOU COULD GO TO SLEEP AND NOT WAKE UP?: NO
2. HAVE YOU ACTUALLY HAD ANY THOUGHTS OF KILLING YOURSELF?: NO

## 2023-10-04 ASSESSMENT — PATIENT HEALTH QUESTIONNAIRE - PHQ9
SUM OF ALL RESPONSES TO PHQ9 QUESTIONS 1 AND 2: 2
SUM OF ALL RESPONSES TO PHQ9 QUESTIONS 1 AND 2: 2
10. IF YOU CHECKED OFF ANY PROBLEMS, HOW DIFFICULT HAVE THESE PROBLEMS MADE IT FOR YOU TO DO YOUR WORK, TAKE CARE OF THINGS AT HOME, OR GET ALONG WITH OTHER PEOPLE: SOMEWHAT DIFFICULT
2. FEELING DOWN, DEPRESSED OR HOPELESS: SEVERAL DAYS
1. LITTLE INTEREST OR PLEASURE IN DOING THINGS: SEVERAL DAYS
2. FEELING DOWN, DEPRESSED OR HOPELESS: SEVERAL DAYS
1. LITTLE INTEREST OR PLEASURE IN DOING THINGS: SEVERAL DAYS

## 2023-10-04 NOTE — LETTER
October 4, 2023    Shirley Newberry  [address]  [place] [address]      To whomever this may concern,    Patient is under my professional care. I am intimately aware of the patients medical history and functional restrictions brought by their mental condition. My patient meets the definition of disabled under the American with Disabilities Act, the rehabilitation Act of 1973, and the Fair Housing Act.    As a results of mental illness, my patient has certain limitations related to (Anxiety, depression, social interactions, phobia, ect.) In order to assist in alleviating these difficulties, and to improve their ability to lead a better life while fully enjoying and using the dwelling unit you own and/or manage, I am prescribing an Emotional support animal prescription letter that will help my patient in dealing with their disability better.    I am very much aware of the voluminous professional literature related to the therapeutic benefits of emotional support animals for individuals with mental disabilities, such as that faced by my patient. Upon request, I will share quotations to relevant studies and would be more than willing to answer any questions that you may have regarding my recommendation that my patient should have an emotional support animal. Should you have further questions, please do no hesitate to get in touch with me. My office phone number is .      If you have any questions or concerns, please don't hesitate to call.    Sincerely,      Poornima Servin, DEMETRIO-CNP

## 2023-10-04 NOTE — TELEPHONE ENCOUNTER
Please let patient know that repeat Potassium is great. Continue current management. Repeat CBC, CMP, TSH, B12, D, A1C,Urine Albumin prior to follow up appointment. Thank you!

## 2023-10-04 NOTE — PROGRESS NOTES
"Subjective   Patient ID: Shirley Newberry is a 82 y.o. female who presents for Follow-up (Follow up, flu shot, letter for bird, unable to sleep RJ took her off of the trazodone due to potassium level being low.).  HPI    New patient here today to transfer care from .     Presents as an 82-year-old female with past medical history of cardiovascular disease, COPD due to current cigarette smoking about 3 cigarettes a day, hypertension, hypertrophic cardiomyopathy, osteoporosis, vitamin deficiency, prediabetes, hypothyroidism, and hyperlipidemia. She recently saw her cardiologist for chest pain at the [facility] prior to the last 2 encounters here. An echocardiogram was completed that showed an ejection fraction of 84% with no acute ischemia.  She had recent lab work done completed a month ago that showed hypokalemia.  She was given instructions to repeat a CMP prior to this encounter to begin taking potassium chloride supplements 20 mEq per day.  She reports that she forgot to get her blood work done.  She states she is going to get her blood work done today.  She also reports that the hydroxyzine and Unisom did not work for her insomnia.  She is wonder if she can get anything prescribed. Was given Trazodone but did not start due to her Potassium being low in the past. Other than this, the patient denies any chest pain, shortness of breath, diarrhea, fevers, chills, head pain, COVID-like symptoms.      Patient currently lives alone. Her bird is \"all she has.\" Feels that she is not alone when she has her bird. She has had this bird for the past 19 years. Sings and talks to her. Communicates and makes her feel happy. Provides solo. Requesting a letter to be able to continue have her Bird in her home.     Review of Systems   Constitutional:  Negative for activity change, appetite change, chills, fatigue, fever and unexpected weight change.   HENT:  Negative for ear pain, hearing loss, nosebleeds, sore throat, " tinnitus and trouble swallowing.    Eyes:  Negative for photophobia, pain and visual disturbance.   Respiratory:  Positive for shortness of breath. Negative for cough, chest tightness and wheezing.    Cardiovascular:  Negative for chest pain, palpitations and leg swelling.   Gastrointestinal:  Negative for abdominal pain, blood in stool, constipation, diarrhea, nausea and vomiting.   Endocrine: Negative for cold intolerance, heat intolerance, polydipsia and polyuria.   Genitourinary:  Negative for dysuria, flank pain and hematuria.   Musculoskeletal:  Negative for arthralgias, back pain, joint swelling, myalgias and neck pain.   Skin:  Negative for pallor, rash and wound.   Allergic/Immunologic: Negative for immunocompromised state.   Neurological:  Negative for dizziness, seizures and headaches.   Hematological:  Does not bruise/bleed easily.   Psychiatric/Behavioral:  Negative for confusion and sleep disturbance.        Objective   Physical Exam  Vitals and nursing note reviewed.   Constitutional:       General: She is not in acute distress.     Appearance: Normal appearance.   HENT:      Head: Normocephalic.      Nose: Nose normal.   Eyes:      Conjunctiva/sclera: Conjunctivae normal.   Neck:      Vascular: No carotid bruit.   Cardiovascular:      Rate and Rhythm: Normal rate and regular rhythm.      Pulses: Normal pulses.      Heart sounds: Normal heart sounds.   Pulmonary:      Effort: Pulmonary effort is normal.      Breath sounds: Normal breath sounds.   Abdominal:      General: Bowel sounds are normal.      Palpations: Abdomen is soft.   Musculoskeletal:         General: Normal range of motion.      Cervical back: Normal range of motion.   Skin:     General: Skin is warm and dry.   Neurological:      Mental Status: She is alert and oriented to person, place, and time. Mental status is at baseline.   Psychiatric:         Mood and Affect: Mood normal.         Behavior: Behavior normal.         Assessment/Plan        New order for blood work provided for patient. Plan to repeat after visit today.      Insomnia: Failed Unisom and Hydroxyzine.  Trazodone 50 mg oral tablet nightly.  We have discussed potential side effects and risks of taking trazodone at night.  Please ensure that you are changing positions slowly as this medication can cause drowsiness and dizziness.  Please call the office back in a couple weeks for medication effectiveness and efficiency.  Hypertension, Hypokalemia: Continue medications as prescribed. Repeat BMP ordered.  Anxiety: Has Hydroxyzine PRN. Support letter provided.     Flu Vaccine: October 2023.   Prevnar 20: November 2022.   Discussed Shingrix and Tdap.   Bone Density: October 2022. Osteoporosis.

## 2023-10-05 ENCOUNTER — TELEPHONE (OUTPATIENT)
Dept: PRIMARY CARE | Facility: CLINIC | Age: 83
End: 2023-10-05
Payer: MEDICARE

## 2023-10-05 NOTE — TELEPHONE ENCOUNTER
Poornima Servin, APRN-CNP  Delmis Hahn, ACE; Adrianna Thomson MA  Please let patient know that repeat Potassium is great. Continue current management. Repeat CBC, CMP, TSH, B12, D, A1C,Urine Albumin prior to follow up appointment. Thank you!

## 2023-10-20 ENCOUNTER — TELEPHONE (OUTPATIENT)
Dept: PRIMARY CARE | Facility: CLINIC | Age: 83
End: 2023-10-20
Payer: MEDICARE

## 2023-10-20 DIAGNOSIS — E03.9 HYPOTHYROIDISM, UNSPECIFIED TYPE: ICD-10-CM

## 2023-10-20 RX ORDER — LEVOTHYROXINE SODIUM 50 UG/1
50 TABLET ORAL DAILY
Qty: 90 TABLET | Refills: 0 | Status: SHIPPED | OUTPATIENT
Start: 2023-10-20 | End: 2024-01-23

## 2023-10-20 NOTE — TELEPHONE ENCOUNTER
Rx Refill Request Telephone Encounter    Name:  Shirley Newberry  :  [id number]  Medication Name:  Levothyroxine  50 mcg          Specific Pharmacy location:  [facility]

## 2023-12-07 ENCOUNTER — TELEPHONE (OUTPATIENT)
Dept: PRIMARY CARE | Facility: CLINIC | Age: 83
End: 2023-12-07
Payer: MEDICARE

## 2023-12-07 DIAGNOSIS — J45.909 ASTHMA, UNSPECIFIED ASTHMA SEVERITY, UNSPECIFIED WHETHER COMPLICATED, UNSPECIFIED WHETHER PERSISTENT (HHS-HCC): Primary | ICD-10-CM

## 2023-12-07 RX ORDER — ALBUTEROL SULFATE 90 UG/1
2 AEROSOL, METERED RESPIRATORY (INHALATION) EVERY 4 HOURS PRN
Qty: 18 G | Refills: 3 | Status: SHIPPED | OUTPATIENT
Start: 2023-12-07 | End: 2023-12-12 | Stop reason: SDUPTHER

## 2023-12-12 ENCOUNTER — TELEPHONE (OUTPATIENT)
Dept: PRIMARY CARE | Facility: CLINIC | Age: 83
End: 2023-12-12
Payer: MEDICARE

## 2023-12-12 DIAGNOSIS — J45.909 ASTHMA, UNSPECIFIED ASTHMA SEVERITY, UNSPECIFIED WHETHER COMPLICATED, UNSPECIFIED WHETHER PERSISTENT (HHS-HCC): ICD-10-CM

## 2023-12-12 RX ORDER — ALBUTEROL SULFATE 90 UG/1
2 AEROSOL, METERED RESPIRATORY (INHALATION) EVERY 4 HOURS PRN
Qty: 18 G | Refills: 3 | Status: SHIPPED | OUTPATIENT
Start: 2023-12-12

## 2023-12-12 NOTE — TELEPHONE ENCOUNTER
[facility] pharmacy needs Albuterol inhaler 90 mcg resent with correct directions     (There were 2 different directions on the one sent)

## 2023-12-28 DIAGNOSIS — E87.6 HYPOKALEMIA: ICD-10-CM

## 2023-12-28 RX ORDER — POTASSIUM CHLORIDE 1500 MG/1
20 TABLET, EXTENDED RELEASE ORAL 2 TIMES DAILY
Qty: 180 TABLET | Refills: 3 | Status: SHIPPED | OUTPATIENT
Start: 2023-12-28

## 2024-01-23 DIAGNOSIS — E03.9 HYPOTHYROIDISM, UNSPECIFIED TYPE: ICD-10-CM

## 2024-01-23 RX ORDER — LEVOTHYROXINE SODIUM 50 UG/1
50 TABLET ORAL DAILY
Qty: 90 TABLET | Refills: 3 | Status: SHIPPED | OUTPATIENT
Start: 2024-01-23

## 2024-02-09 ENCOUNTER — APPOINTMENT (OUTPATIENT)
Dept: PRIMARY CARE | Facility: CLINIC | Age: 84
End: 2024-02-09
Payer: MEDICARE

## 2024-12-11 ENCOUNTER — HOSPITAL ENCOUNTER (INPATIENT)
Age: 84
LOS: 6 days | Discharge: HOME | DRG: 516 | End: 2024-12-17
Attending: STUDENT IN AN ORGANIZED HEALTH CARE EDUCATION/TRAINING PROGRAM | Admitting: STUDENT IN AN ORGANIZED HEALTH CARE EDUCATION/TRAINING PROGRAM
Payer: MEDICARE

## 2024-12-11 DIAGNOSIS — S32.000A CLOSED COMPRESSION FRACTURE OF LUMBOSACRAL SPINE, INITIAL ENCOUNTER (HCC): ICD-10-CM

## 2024-12-11 DIAGNOSIS — Z00.6 ENCOUNTER FOR EXAMINATION FOR NORMAL COMPARISON OR CONTROL IN CLINICAL RESEARCH PROGRAM: Primary | ICD-10-CM

## 2024-12-11 DIAGNOSIS — G47.30 SLEEP APNEA, UNSPECIFIED TYPE: ICD-10-CM

## 2024-12-11 PROBLEM — S32.010A CLOSED COMPRESSION FRACTURE OF BODY OF L1 VERTEBRA (HCC): Status: ACTIVE | Noted: 2024-12-11

## 2024-12-11 PROCEDURE — 6820000001 HC L2 TRAUMA SURGERY EVALUATION: Performed by: ORTHOPAEDIC SURGERY

## 2024-12-11 PROCEDURE — 99223 1ST HOSP IP/OBS HIGH 75: CPT

## 2024-12-11 PROCEDURE — 82565 ASSAY OF CREATININE: CPT

## 2024-12-11 PROCEDURE — 93005 ELECTROCARDIOGRAM TRACING: CPT

## 2024-12-11 PROCEDURE — 1200000000 HC SEMI PRIVATE

## 2024-12-11 PROCEDURE — 36415 COLL VENOUS BLD VENIPUNCTURE: CPT

## 2024-12-11 RX ORDER — LEVOTHYROXINE SODIUM 50 UG/1
50 TABLET ORAL DAILY
COMMUNITY

## 2024-12-11 RX ORDER — ALBUTEROL SULFATE 90 UG/1
2 INHALANT RESPIRATORY (INHALATION) EVERY 6 HOURS PRN
COMMUNITY

## 2024-12-11 RX ORDER — HYDROCHLOROTHIAZIDE 25 MG/1
25 TABLET ORAL DAILY
COMMUNITY

## 2024-12-11 RX ORDER — ASPIRIN 81 MG/1
81 TABLET, CHEWABLE ORAL NIGHTLY
COMMUNITY

## 2024-12-11 RX ORDER — ACETAMINOPHEN 650 MG/1
650 SUPPOSITORY RECTAL EVERY 6 HOURS PRN
Status: DISCONTINUED | OUTPATIENT
Start: 2024-12-11 | End: 2024-12-17 | Stop reason: HOSPADM

## 2024-12-11 RX ORDER — POTASSIUM CHLORIDE 1.5 G/1.58G
20 POWDER, FOR SOLUTION ORAL 2 TIMES DAILY
Status: ON HOLD | COMMUNITY
End: 2024-12-17

## 2024-12-11 RX ORDER — ENOXAPARIN SODIUM 100 MG/ML
40 INJECTION SUBCUTANEOUS DAILY
Status: DISCONTINUED | OUTPATIENT
Start: 2024-12-12 | End: 2024-12-17 | Stop reason: HOSPADM

## 2024-12-11 RX ORDER — ATORVASTATIN CALCIUM 40 MG/1
40 TABLET, FILM COATED ORAL DAILY
COMMUNITY

## 2024-12-11 RX ORDER — POLYETHYLENE GLYCOL 3350 17 G/17G
17 POWDER, FOR SOLUTION ORAL DAILY PRN
Status: DISCONTINUED | OUTPATIENT
Start: 2024-12-11 | End: 2024-12-17 | Stop reason: HOSPADM

## 2024-12-11 RX ORDER — SODIUM CHLORIDE 0.9 % (FLUSH) 0.9 %
5-40 SYRINGE (ML) INJECTION EVERY 12 HOURS SCHEDULED
Status: DISCONTINUED | OUTPATIENT
Start: 2024-12-11 | End: 2024-12-17 | Stop reason: HOSPADM

## 2024-12-11 RX ORDER — ONDANSETRON 2 MG/ML
4 INJECTION INTRAMUSCULAR; INTRAVENOUS EVERY 6 HOURS PRN
Status: DISCONTINUED | OUTPATIENT
Start: 2024-12-11 | End: 2024-12-17 | Stop reason: HOSPADM

## 2024-12-11 RX ORDER — ACETAMINOPHEN 325 MG/1
650 TABLET ORAL EVERY 6 HOURS PRN
Status: DISCONTINUED | OUTPATIENT
Start: 2024-12-11 | End: 2024-12-17 | Stop reason: HOSPADM

## 2024-12-11 RX ORDER — ASCORBIC ACID 500 MG
500 TABLET ORAL DAILY
Status: ON HOLD | COMMUNITY
End: 2024-12-17 | Stop reason: HOSPADM

## 2024-12-11 RX ORDER — MAGNESIUM SULFATE IN WATER 40 MG/ML
2000 INJECTION, SOLUTION INTRAVENOUS PRN
Status: DISCONTINUED | OUTPATIENT
Start: 2024-12-11 | End: 2024-12-17 | Stop reason: HOSPADM

## 2024-12-11 RX ORDER — AMLODIPINE BESYLATE 10 MG/1
10 TABLET ORAL DAILY
COMMUNITY

## 2024-12-11 RX ORDER — METOPROLOL SUCCINATE 100 MG/1
100 TABLET, EXTENDED RELEASE ORAL DAILY
COMMUNITY

## 2024-12-11 RX ORDER — ALENDRONATE SODIUM 70 MG/1
70 TABLET ORAL
COMMUNITY

## 2024-12-11 RX ORDER — ONDANSETRON 4 MG/1
4 TABLET, ORALLY DISINTEGRATING ORAL EVERY 8 HOURS PRN
Status: DISCONTINUED | OUTPATIENT
Start: 2024-12-11 | End: 2024-12-17 | Stop reason: HOSPADM

## 2024-12-11 RX ORDER — SODIUM CHLORIDE 9 MG/ML
INJECTION, SOLUTION INTRAVENOUS PRN
Status: DISCONTINUED | OUTPATIENT
Start: 2024-12-11 | End: 2024-12-17 | Stop reason: HOSPADM

## 2024-12-11 RX ORDER — POTASSIUM CHLORIDE 1500 MG/1
40 TABLET, EXTENDED RELEASE ORAL PRN
Status: DISCONTINUED | OUTPATIENT
Start: 2024-12-11 | End: 2024-12-17 | Stop reason: HOSPADM

## 2024-12-11 RX ORDER — ISOSORBIDE MONONITRATE 30 MG/1
30 TABLET, EXTENDED RELEASE ORAL DAILY
COMMUNITY

## 2024-12-11 RX ORDER — SODIUM CHLORIDE 0.9 % (FLUSH) 0.9 %
5-40 SYRINGE (ML) INJECTION PRN
Status: DISCONTINUED | OUTPATIENT
Start: 2024-12-11 | End: 2024-12-17 | Stop reason: HOSPADM

## 2024-12-11 RX ORDER — POTASSIUM CHLORIDE 7.45 MG/ML
10 INJECTION INTRAVENOUS PRN
Status: DISCONTINUED | OUTPATIENT
Start: 2024-12-11 | End: 2024-12-17 | Stop reason: HOSPADM

## 2024-12-11 RX ORDER — ZINC GLUCONATE 50 MG
50 TABLET ORAL DAILY
COMMUNITY

## 2024-12-11 ASSESSMENT — PAIN DESCRIPTION - LOCATION: LOCATION: BACK

## 2024-12-11 ASSESSMENT — PAIN DESCRIPTION - FREQUENCY: FREQUENCY: CONTINUOUS

## 2024-12-11 ASSESSMENT — PAIN DESCRIPTION - ORIENTATION: ORIENTATION: LOWER

## 2024-12-11 ASSESSMENT — PAIN SCALES - GENERAL: PAINLEVEL_OUTOF10: 8

## 2024-12-11 ASSESSMENT — PAIN DESCRIPTION - PAIN TYPE: TYPE: ACUTE PAIN

## 2024-12-11 ASSESSMENT — PAIN DESCRIPTION - DESCRIPTORS: DESCRIPTORS: ACHING

## 2024-12-11 ASSESSMENT — PAIN - FUNCTIONAL ASSESSMENT: PAIN_FUNCTIONAL_ASSESSMENT: ACTIVITIES ARE NOT PREVENTED

## 2024-12-11 ASSESSMENT — PAIN DESCRIPTION - ONSET: ONSET: ON-GOING

## 2024-12-12 ENCOUNTER — APPOINTMENT (OUTPATIENT)
Dept: MRI IMAGING | Age: 84
DRG: 516 | End: 2024-12-12
Attending: STUDENT IN AN ORGANIZED HEALTH CARE EDUCATION/TRAINING PROGRAM
Payer: MEDICARE

## 2024-12-12 ENCOUNTER — APPOINTMENT (OUTPATIENT)
Dept: GENERAL RADIOLOGY | Age: 84
DRG: 516 | End: 2024-12-12
Attending: STUDENT IN AN ORGANIZED HEALTH CARE EDUCATION/TRAINING PROGRAM
Payer: MEDICARE

## 2024-12-12 LAB
ANION GAP SERPL CALC-SCNC: 10 MEQ/L (ref 8–16)
BASOPHILS ABSOLUTE: 0.1 THOU/MM3 (ref 0–0.1)
BASOPHILS NFR BLD AUTO: 0.5 %
BUN SERPL-MCNC: 19 MG/DL (ref 7–22)
CALCIUM SERPL-MCNC: 8.7 MG/DL (ref 8.5–10.5)
CHLORIDE SERPL-SCNC: 105 MEQ/L (ref 98–111)
CO2 SERPL-SCNC: 24 MEQ/L (ref 23–33)
CREAT SERPL-MCNC: 0.6 MG/DL (ref 0.4–1.2)
CREAT SERPL-MCNC: 0.6 MG/DL (ref 0.4–1.2)
DEPRECATED RDW RBC AUTO: 47.5 FL (ref 35–45)
EKG ATRIAL RATE: 73 BPM
EKG P AXIS: 73 DEGREES
EKG P-R INTERVAL: 188 MS
EKG Q-T INTERVAL: 400 MS
EKG QRS DURATION: 80 MS
EKG QTC CALCULATION (BAZETT): 440 MS
EKG R AXIS: 80 DEGREES
EKG T AXIS: 61 DEGREES
EKG VENTRICULAR RATE: 73 BPM
EOSINOPHIL NFR BLD AUTO: 0.9 %
EOSINOPHILS ABSOLUTE: 0.1 THOU/MM3 (ref 0–0.4)
ERYTHROCYTE [DISTWIDTH] IN BLOOD BY AUTOMATED COUNT: 13.9 % (ref 11.5–14.5)
GFR SERPL CREATININE-BSD FRML MDRD: 90 ML/MIN/1.73M2
GFR SERPL CREATININE-BSD FRML MDRD: 90 ML/MIN/1.73M2
GLUCOSE SERPL-MCNC: 109 MG/DL (ref 70–108)
HCT VFR BLD AUTO: 43.9 % (ref 37–47)
HGB BLD-MCNC: 14.4 GM/DL (ref 12–16)
IMM GRANULOCYTES # BLD AUTO: 0.05 THOU/MM3 (ref 0–0.07)
IMM GRANULOCYTES NFR BLD AUTO: 0.4 %
LYMPHOCYTES ABSOLUTE: 3 THOU/MM3 (ref 1–4.8)
LYMPHOCYTES NFR BLD AUTO: 25.7 %
MCH RBC QN AUTO: 30.4 PG (ref 26–33)
MCHC RBC AUTO-ENTMCNC: 32.8 GM/DL (ref 32.2–35.5)
MCV RBC AUTO: 92.6 FL (ref 81–99)
MONOCYTES ABSOLUTE: 0.8 THOU/MM3 (ref 0.4–1.3)
MONOCYTES NFR BLD AUTO: 7 %
NEUTROPHILS ABSOLUTE: 7.5 THOU/MM3 (ref 1.8–7.7)
NEUTROPHILS NFR BLD AUTO: 65.5 %
NRBC BLD AUTO-RTO: 0 /100 WBC
PLATELET # BLD AUTO: 209 THOU/MM3 (ref 130–400)
PMV BLD AUTO: 11.5 FL (ref 9.4–12.4)
POTASSIUM SERPL-SCNC: 3.9 MEQ/L (ref 3.5–5.2)
RBC # BLD AUTO: 4.74 MILL/MM3 (ref 4.2–5.4)
SODIUM SERPL-SCNC: 139 MEQ/L (ref 135–145)
WBC # BLD AUTO: 11.5 THOU/MM3 (ref 4.8–10.8)

## 2024-12-12 PROCEDURE — 85025 COMPLETE CBC W/AUTO DIFF WBC: CPT

## 2024-12-12 PROCEDURE — 72148 MRI LUMBAR SPINE W/O DYE: CPT

## 2024-12-12 PROCEDURE — 6370000000 HC RX 637 (ALT 250 FOR IP)

## 2024-12-12 PROCEDURE — 1200000000 HC SEMI PRIVATE

## 2024-12-12 PROCEDURE — 99232 SBSQ HOSP IP/OBS MODERATE 35: CPT | Performed by: PHYSICIAN ASSISTANT

## 2024-12-12 PROCEDURE — 36415 COLL VENOUS BLD VENIPUNCTURE: CPT

## 2024-12-12 PROCEDURE — 80048 BASIC METABOLIC PNL TOTAL CA: CPT

## 2024-12-12 PROCEDURE — 6370000000 HC RX 637 (ALT 250 FOR IP): Performed by: PHYSICIAN ASSISTANT

## 2024-12-12 PROCEDURE — 6360000002 HC RX W HCPCS

## 2024-12-12 PROCEDURE — 93010 ELECTROCARDIOGRAM REPORT: CPT | Performed by: NUCLEAR MEDICINE

## 2024-12-12 PROCEDURE — 2580000003 HC RX 258

## 2024-12-12 PROCEDURE — 93005 ELECTROCARDIOGRAM TRACING: CPT

## 2024-12-12 PROCEDURE — 71045 X-RAY EXAM CHEST 1 VIEW: CPT

## 2024-12-12 RX ORDER — ASPIRIN 81 MG/1
81 TABLET, CHEWABLE ORAL NIGHTLY
Status: DISCONTINUED | OUTPATIENT
Start: 2024-12-12 | End: 2024-12-17 | Stop reason: HOSPADM

## 2024-12-12 RX ORDER — LEVOTHYROXINE SODIUM 50 UG/1
50 TABLET ORAL DAILY
Status: DISCONTINUED | OUTPATIENT
Start: 2024-12-12 | End: 2024-12-17 | Stop reason: HOSPADM

## 2024-12-12 RX ORDER — FUROSEMIDE 10 MG/ML
20 INJECTION INTRAMUSCULAR; INTRAVENOUS ONCE
Status: COMPLETED | OUTPATIENT
Start: 2024-12-12 | End: 2024-12-12

## 2024-12-12 RX ORDER — METOPROLOL SUCCINATE 100 MG/1
100 TABLET, EXTENDED RELEASE ORAL DAILY
Status: DISCONTINUED | OUTPATIENT
Start: 2024-12-12 | End: 2024-12-17 | Stop reason: HOSPADM

## 2024-12-12 RX ORDER — AMLODIPINE BESYLATE 10 MG/1
10 TABLET ORAL DAILY
Status: DISCONTINUED | OUTPATIENT
Start: 2024-12-12 | End: 2024-12-17 | Stop reason: HOSPADM

## 2024-12-12 RX ORDER — ISOSORBIDE MONONITRATE 30 MG/1
30 TABLET, EXTENDED RELEASE ORAL DAILY
Status: DISCONTINUED | OUTPATIENT
Start: 2024-12-12 | End: 2024-12-17 | Stop reason: HOSPADM

## 2024-12-12 RX ORDER — ALBUTEROL SULFATE 90 UG/1
2 INHALANT RESPIRATORY (INHALATION) EVERY 6 HOURS PRN
Status: DISCONTINUED | OUTPATIENT
Start: 2024-12-12 | End: 2024-12-17 | Stop reason: HOSPADM

## 2024-12-12 RX ORDER — OXYCODONE AND ACETAMINOPHEN 5; 325 MG/1; MG/1
2 TABLET ORAL EVERY 4 HOURS PRN
Status: DISCONTINUED | OUTPATIENT
Start: 2024-12-12 | End: 2024-12-17 | Stop reason: HOSPADM

## 2024-12-12 RX ORDER — HYDROCHLOROTHIAZIDE 25 MG/1
25 TABLET ORAL DAILY
Status: DISCONTINUED | OUTPATIENT
Start: 2024-12-12 | End: 2024-12-17 | Stop reason: HOSPADM

## 2024-12-12 RX ORDER — MORPHINE SULFATE 2 MG/ML
2 INJECTION, SOLUTION INTRAMUSCULAR; INTRAVENOUS
Status: DISCONTINUED | OUTPATIENT
Start: 2024-12-12 | End: 2024-12-17 | Stop reason: HOSPADM

## 2024-12-12 RX ORDER — OXYCODONE AND ACETAMINOPHEN 5; 325 MG/1; MG/1
1 TABLET ORAL EVERY 4 HOURS PRN
Status: DISCONTINUED | OUTPATIENT
Start: 2024-12-12 | End: 2024-12-17 | Stop reason: HOSPADM

## 2024-12-12 RX ORDER — ATORVASTATIN CALCIUM 40 MG/1
40 TABLET, FILM COATED ORAL DAILY
Status: DISCONTINUED | OUTPATIENT
Start: 2024-12-12 | End: 2024-12-17 | Stop reason: HOSPADM

## 2024-12-12 RX ORDER — MORPHINE SULFATE 2 MG/ML
1 INJECTION, SOLUTION INTRAMUSCULAR; INTRAVENOUS
Status: DISCONTINUED | OUTPATIENT
Start: 2024-12-12 | End: 2024-12-17 | Stop reason: HOSPADM

## 2024-12-12 RX ORDER — LIDOCAINE 4 G/G
2 PATCH TOPICAL DAILY
Status: DISCONTINUED | OUTPATIENT
Start: 2024-12-12 | End: 2024-12-17 | Stop reason: HOSPADM

## 2024-12-12 RX ADMIN — SODIUM CHLORIDE, PRESERVATIVE FREE 5 ML: 5 INJECTION INTRAVENOUS at 20:14

## 2024-12-12 RX ADMIN — HYDROCHLOROTHIAZIDE 25 MG: 25 TABLET ORAL at 10:23

## 2024-12-12 RX ADMIN — LEVOTHYROXINE SODIUM 50 MCG: 0.05 TABLET ORAL at 05:50

## 2024-12-12 RX ADMIN — OXYCODONE HYDROCHLORIDE AND ACETAMINOPHEN 2 TABLET: 5; 325 TABLET ORAL at 15:54

## 2024-12-12 RX ADMIN — OXYCODONE HYDROCHLORIDE AND ACETAMINOPHEN 1 TABLET: 5; 325 TABLET ORAL at 23:14

## 2024-12-12 RX ADMIN — METOPROLOL SUCCINATE 100 MG: 100 TABLET, EXTENDED RELEASE ORAL at 10:23

## 2024-12-12 RX ADMIN — OXYCODONE HYDROCHLORIDE AND ACETAMINOPHEN 2 TABLET: 5; 325 TABLET ORAL at 11:29

## 2024-12-12 RX ADMIN — ATORVASTATIN CALCIUM 40 MG: 40 TABLET, FILM COATED ORAL at 10:23

## 2024-12-12 RX ADMIN — SODIUM CHLORIDE, PRESERVATIVE FREE 10 ML: 5 INJECTION INTRAVENOUS at 10:23

## 2024-12-12 RX ADMIN — MORPHINE SULFATE 2 MG: 2 INJECTION, SOLUTION INTRAMUSCULAR; INTRAVENOUS at 04:09

## 2024-12-12 RX ADMIN — ISOSORBIDE MONONITRATE 30 MG: 30 TABLET, EXTENDED RELEASE ORAL at 10:23

## 2024-12-12 RX ADMIN — FUROSEMIDE 20 MG: 10 INJECTION, SOLUTION INTRAMUSCULAR; INTRAVENOUS at 23:14

## 2024-12-12 RX ADMIN — MORPHINE SULFATE 2 MG: 2 INJECTION, SOLUTION INTRAMUSCULAR; INTRAVENOUS at 00:41

## 2024-12-12 RX ADMIN — AMLODIPINE BESYLATE 10 MG: 10 TABLET ORAL at 10:23

## 2024-12-12 ASSESSMENT — PAIN DESCRIPTION - LOCATION
LOCATION: BACK
LOCATION: ABDOMEN
LOCATION: BACK
LOCATION: BACK

## 2024-12-12 ASSESSMENT — PAIN DESCRIPTION - ORIENTATION
ORIENTATION: LOWER
ORIENTATION: LOWER;LEFT;RIGHT
ORIENTATION: LOWER
ORIENTATION: LOWER

## 2024-12-12 ASSESSMENT — PAIN DESCRIPTION - DESCRIPTORS
DESCRIPTORS: ACHING
DESCRIPTORS: DULL
DESCRIPTORS: ACHING
DESCRIPTORS: STABBING
DESCRIPTORS: ACHING
DESCRIPTORS: ACHING;DISCOMFORT
DESCRIPTORS: ACHING;DISCOMFORT

## 2024-12-12 ASSESSMENT — PAIN - FUNCTIONAL ASSESSMENT
PAIN_FUNCTIONAL_ASSESSMENT: PREVENTS OR INTERFERES SOME ACTIVE ACTIVITIES AND ADLS
PAIN_FUNCTIONAL_ASSESSMENT: ACTIVITIES ARE NOT PREVENTED
PAIN_FUNCTIONAL_ASSESSMENT: ACTIVITIES ARE NOT PREVENTED

## 2024-12-12 ASSESSMENT — PAIN DESCRIPTION - ONSET
ONSET: ON-GOING
ONSET: ON-GOING

## 2024-12-12 ASSESSMENT — PAIN SCALES - GENERAL
PAINLEVEL_OUTOF10: 2
PAINLEVEL_OUTOF10: 6
PAINLEVEL_OUTOF10: 8
PAINLEVEL_OUTOF10: 5
PAINLEVEL_OUTOF10: 8
PAINLEVEL_OUTOF10: 4
PAINLEVEL_OUTOF10: 8
PAINLEVEL_OUTOF10: 5
PAINLEVEL_OUTOF10: 4
PAINLEVEL_OUTOF10: 10
PAINLEVEL_OUTOF10: 5
PAINLEVEL_OUTOF10: 0
PAINLEVEL_OUTOF10: 5
PAINLEVEL_OUTOF10: 10
PAINLEVEL_OUTOF10: 8

## 2024-12-12 ASSESSMENT — PAIN DESCRIPTION - PAIN TYPE
TYPE: ACUTE PAIN
TYPE: ACUTE PAIN

## 2024-12-12 ASSESSMENT — PAIN DESCRIPTION - FREQUENCY
FREQUENCY: CONTINUOUS
FREQUENCY: INTERMITTENT

## 2024-12-12 NOTE — PROCEDURES
PROCEDURE NOTE  Date: 12/11/2024   Name: Natty Suarez  YOB: 1940    Procedures        EKG was completed and handed to RN

## 2024-12-12 NOTE — CONSULTS
Orthopedic Spine Consult  Department of Orthopedic Surgery  Attending: Dr. Amos Joya      Inpatient consult to Orthopedic Surgery  Consult performed by: Mell Vergara APRN - CNP  Consult ordered by: Lupe Luz APRN - CNP          Chief Complaint: Back pain status post fall    HPI: Natty is an 84-year-old female who presents to [facility] ER from an outside hospital with complaints of intractable back pain status post fall.  She reportedly was attempting to retrieve [place] decorations from a high shelf in which she stood on her exercise bike pedal for assistance in height and lost her footing and fell backwards landing on her lower back.  She denies any significant back or lower extremity radicular symptoms prior to fall.  She currently complains of a pain rating a 8 out of 10 in which she notes lower extremity numbness in which she describes as numbness which was noted overnight.  She does have a history of osteoporosis.  She reports she did buy some back patches off of the TV prior to her fall with complaints of left-sided flank pain.  She denies any weakness of her lower extremities.  She denies utilizing a walker or a cane with indication she is very independent she reports and feels this fall has been a significant habit with admission to the hospital.  She denies any change in her bowel or bladder continence.  Orthospine was consulted for further evaluation and recommendation with treatment options.    Assessment:   Intractable back pain status post fall  Compression fractures  Traumatic fall  Lower extremity radiculopathy  Osteoporosis    Plan:   MRI lumbar spine to evaluate acuity of compression fractures for consideration of augmentation  Pain control per primary  Activity as tolerated with PT OT  Discharge pending clinical condition and will defer to primary service    No Known Allergies  Prior to Visit Medications    Medication Sig Taking? Authorizing Provider

## 2024-12-12 NOTE — CARE COORDINATION
Case Management Assessment Initial Evaluation    Date/Time of Evaluation: 2024 7:12 AM  Assessment Completed by: Geraldine Cowan RN    If patient is discharged prior to next notation, then this note serves as note for discharge by case management.    Patient Name: Natty Suarez                   YOB: 1940  Diagnosis: Closed compression fracture of body of L1 vertebra (HCC) [S32.010A]                   Date / Time: 2024  9:18 PM  Location: [address] [place] [address]     Patient Admission Status: Inpatient   Readmission Risk Low 0-14, Mod 15-19), High > 20: Readmission Risk Score: 8.2    Current PCP: Seamus Rosas APRN - CNP  Health Care Decision Makers:     Additional Case Management Notes: tx in from [facility], [facility], has indeterminate comp fx L1 at 10-20 % height loss and L3 at 50% height loss.    Ortho spine consulted. requires TLSO back brace, pain control, possible procedure Friday, kyphoplasty (TBD).  PMHx of CHF, COPD, CAD   Procedures: none    Imagin/12 MRI lumbar spine    1. Transitional vertebral body at the lumbosacral junction described as a  partially lumbarized S1 vertebral body for the purposes of this report.  2. Abnormal signal intensity in the L3 vertebral body consistent with an acute  fracture with 20% compression. No retropulsion into the spinal canal.  3. Abnormal signal intensity along the inferior aspect of T12 consistent with an  acute fracture with 15% compression. No retropulsion into the spinal canal.  4. There is moderate canal bilateral foraminal stenosis at L3-4.  5. There is mild to moderate canal and moderate bilateral foraminal stenosis at  L4-5 and L5-S1.  6. There is degenerative change involving the sacroiliac joints bilaterally.  Patient Goals/Plan/Treatment Preferences: Spoke with Natty; she lives at home alone in [facility]. She plans to go stay with close friend Nai who lives five min. rom her home. [facility]

## 2024-12-12 NOTE — H&P
Hospitalist History & Physical    Patient:  Natty Suarez    Unit/Bed:706/006-A  YOB: 1940  MRN: [id number]   Acct: [id number]   PCP: Seamus Rosas APRN - CNP  Code Status: No Order    Date of Service: Pt seen/examined on 12/11/24 and admitted to Inpatient with expected LOS greater than two midnights due to medical therapy.     Chief Complaint: low back pain following a fall    Assessment/Plan:    Compression fracture of L1 and L3, indeterminate age: OSH CT Lumbar Spine notes inferior endplate compression deformity of vertebral body L3 with approximately 50% central height loss, superior endplate compression deformity of vertebral body L1 with 10-20% height loss.    CT interpretation of OSH images pending  MRI Lumbar spine pending  Ortho spine consulted by OSH  Pain control: Lidocaine patches, PRN Morphine  Bedrest with BRP  NPO in case need for surgical intervention    Leukocytosis: WBC 12.01 at OSH. Likely reactive due to above. No s/s of infection.   CBC in AM    Mechanical fall: Reports falling after standing on the pedal of her exercise bike trying to reach [employer]. CT Lumbar spine as noted above. XR Pelvis without acute findings.   Fall precautions  PT/OT when Ortho clears    CAD: 5/2023 Stress test: coronary calcifications visualized.   Hold ASA in case need for surgical intervention  Continue statin    HFpEF: 4/2023 Echo: EF 65%, moderate upper septal LV hypertrophy, grade 1 LV DD.   Continue hydrochlorothiazide, isosorbide  Daily weights, I&O    COPD:   Continue PRN albuterol    Essential HTN:   Continue amlodipine, hydrochlorothiazide, isosorbide, metoprolol with holding parameters    Hypothyroidism:   Continue levothyroxine    SERENE/Insomnia: Not currently on any home medications for this.     Tobacco use disorder: Reports smoking 1 pack every 2-3 days.   Encourage cessation  Denies need for nicotine patch      DVT prophylaxis: Lovenox  Diet: No diet orders on

## 2024-12-13 ENCOUNTER — APPOINTMENT (OUTPATIENT)
Dept: INTERVENTIONAL RADIOLOGY/VASCULAR | Age: 84
DRG: 516 | End: 2024-12-13
Attending: STUDENT IN AN ORGANIZED HEALTH CARE EDUCATION/TRAINING PROGRAM
Payer: MEDICARE
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
## 2024-12-13 PROBLEM — I50.32 CHRONIC HEART FAILURE WITH PRESERVED EJECTION FRACTION (HCC): Status: ACTIVE | Noted: 2024-12-13

## 2024-12-13 PROBLEM — E87.6 HYPOKALEMIA: Status: ACTIVE | Noted: 2024-12-13

## 2024-12-13 PROBLEM — S32.000A CLOSED COMPRESSION FRACTURE OF LUMBOSACRAL SPINE (HCC): Status: ACTIVE | Noted: 2024-12-13

## 2024-12-13 PROBLEM — M48.07 STENOSIS OF LUMBOSACRAL SPINE: Status: ACTIVE | Noted: 2024-12-13

## 2024-12-13 LAB
ANION GAP SERPL CALC-SCNC: 11 MEQ/L (ref 8–16)
BASOPHILS ABSOLUTE: 0.1 THOU/MM3 (ref 0–0.1)
BASOPHILS NFR BLD AUTO: 0.6 %
BUN SERPL-MCNC: 16 MG/DL (ref 7–22)
CALCIUM SERPL-MCNC: 8.5 MG/DL (ref 8.5–10.5)
CHLORIDE SERPL-SCNC: 99 MEQ/L (ref 98–111)
CO2 SERPL-SCNC: 25 MEQ/L (ref 23–33)
CREAT SERPL-MCNC: 0.6 MG/DL (ref 0.4–1.2)
DEPRECATED RDW RBC AUTO: 48.7 FL (ref 35–45)
EKG ATRIAL RATE: 64 BPM
EKG P AXIS: 86 DEGREES
EKG P-R INTERVAL: 202 MS
EKG Q-T INTERVAL: 400 MS
EKG QRS DURATION: 80 MS
EKG QTC CALCULATION (BAZETT): 412 MS
EKG R AXIS: 74 DEGREES
EKG T AXIS: 69 DEGREES
EKG VENTRICULAR RATE: 64 BPM
EOSINOPHIL NFR BLD AUTO: 2.5 %
EOSINOPHILS ABSOLUTE: 0.3 THOU/MM3 (ref 0–0.4)
ERYTHROCYTE [DISTWIDTH] IN BLOOD BY AUTOMATED COUNT: 14.1 % (ref 11.5–14.5)
GFR SERPL CREATININE-BSD FRML MDRD: 89 ML/MIN/1.73M2
GLUCOSE SERPL-MCNC: 102 MG/DL (ref 70–108)
HCT VFR BLD AUTO: 43.1 % (ref 37–47)
HGB BLD-MCNC: 13.9 GM/DL (ref 12–16)
IMM GRANULOCYTES # BLD AUTO: 0.02 THOU/MM3 (ref 0–0.07)
IMM GRANULOCYTES NFR BLD AUTO: 0.2 %
LYMPHOCYTES ABSOLUTE: 3.3 THOU/MM3 (ref 1–4.8)
LYMPHOCYTES NFR BLD AUTO: 32.8 %
MAGNESIUM SERPL-MCNC: 2 MG/DL (ref 1.6–2.4)
MCH RBC QN AUTO: 30.4 PG (ref 26–33)
MCHC RBC AUTO-ENTMCNC: 32.3 GM/DL (ref 32.2–35.5)
MCV RBC AUTO: 94.3 FL (ref 81–99)
MONOCYTES ABSOLUTE: 1 THOU/MM3 (ref 0.4–1.3)
MONOCYTES NFR BLD AUTO: 10.1 %
NEUTROPHILS ABSOLUTE: 5.4 THOU/MM3 (ref 1.8–7.7)
NEUTROPHILS NFR BLD AUTO: 53.8 %
NRBC BLD AUTO-RTO: 0 /100 WBC
PLATELET # BLD AUTO: 189 THOU/MM3 (ref 130–400)
PMV BLD AUTO: 11.5 FL (ref 9.4–12.4)
POTASSIUM SERPL-SCNC: 3 MEQ/L (ref 3.5–5.2)
PROCALCITONIN SERPL IA-MCNC: < 0.02 NG/ML (ref 0.01–0.09)
RBC # BLD AUTO: 4.57 MILL/MM3 (ref 4.2–5.4)
SODIUM SERPL-SCNC: 135 MEQ/L (ref 135–145)
WBC # BLD AUTO: 10.1 THOU/MM3 (ref 4.8–10.8)

## 2024-12-13 PROCEDURE — 1200000000 HC SEMI PRIVATE

## 2024-12-13 PROCEDURE — 36415 COLL VENOUS BLD VENIPUNCTURE: CPT

## 2024-12-13 PROCEDURE — 97535 SELF CARE MNGMENT TRAINING: CPT

## 2024-12-13 PROCEDURE — 83735 ASSAY OF MAGNESIUM: CPT

## 2024-12-13 PROCEDURE — 84145 PROCALCITONIN (PCT): CPT

## 2024-12-13 PROCEDURE — 97166 OT EVAL MOD COMPLEX 45 MIN: CPT

## 2024-12-13 PROCEDURE — 2580000003 HC RX 258

## 2024-12-13 PROCEDURE — 97530 THERAPEUTIC ACTIVITIES: CPT

## 2024-12-13 PROCEDURE — 93010 ELECTROCARDIOGRAM REPORT: CPT | Performed by: NUCLEAR MEDICINE

## 2024-12-13 PROCEDURE — 99232 SBSQ HOSP IP/OBS MODERATE 35: CPT

## 2024-12-13 PROCEDURE — 85025 COMPLETE CBC W/AUTO DIFF WBC: CPT

## 2024-12-13 PROCEDURE — 6370000000 HC RX 637 (ALT 250 FOR IP)

## 2024-12-13 PROCEDURE — 80048 BASIC METABOLIC PNL TOTAL CA: CPT

## 2024-12-13 PROCEDURE — 76000 FLUOROSCOPY <1 HR PHYS/QHP: CPT

## 2024-12-13 PROCEDURE — 6370000000 HC RX 637 (ALT 250 FOR IP): Performed by: PHYSICIAN ASSISTANT

## 2024-12-13 PROCEDURE — 97162 PT EVAL MOD COMPLEX 30 MIN: CPT

## 2024-12-13 RX ADMIN — LEVOTHYROXINE SODIUM 50 MCG: 0.05 TABLET ORAL at 05:29

## 2024-12-13 RX ADMIN — OXYCODONE HYDROCHLORIDE AND ACETAMINOPHEN 2 TABLET: 5; 325 TABLET ORAL at 03:51

## 2024-12-13 RX ADMIN — METOPROLOL SUCCINATE 100 MG: 100 TABLET, EXTENDED RELEASE ORAL at 10:38

## 2024-12-13 RX ADMIN — HYDROCHLOROTHIAZIDE 25 MG: 25 TABLET ORAL at 10:37

## 2024-12-13 RX ADMIN — SODIUM CHLORIDE, PRESERVATIVE FREE 10 ML: 5 INJECTION INTRAVENOUS at 10:39

## 2024-12-13 RX ADMIN — ATORVASTATIN CALCIUM 40 MG: 40 TABLET, FILM COATED ORAL at 10:37

## 2024-12-13 RX ADMIN — OXYCODONE HYDROCHLORIDE AND ACETAMINOPHEN 2 TABLET: 5; 325 TABLET ORAL at 10:36

## 2024-12-13 RX ADMIN — POTASSIUM CHLORIDE 40 MEQ: 1500 TABLET, EXTENDED RELEASE ORAL at 10:37

## 2024-12-13 RX ADMIN — POLYETHYLENE GLYCOL 3350 17 G: 17 POWDER, FOR SOLUTION ORAL at 12:04

## 2024-12-13 RX ADMIN — OXYCODONE HYDROCHLORIDE AND ACETAMINOPHEN 2 TABLET: 5; 325 TABLET ORAL at 21:39

## 2024-12-13 RX ADMIN — SODIUM CHLORIDE, PRESERVATIVE FREE 5 ML: 5 INJECTION INTRAVENOUS at 19:55

## 2024-12-13 RX ADMIN — POTASSIUM BICARBONATE 20 MEQ: 782 TABLET, EFFERVESCENT ORAL at 10:36

## 2024-12-13 RX ADMIN — AMLODIPINE BESYLATE 10 MG: 10 TABLET ORAL at 10:37

## 2024-12-13 RX ADMIN — OXYCODONE HYDROCHLORIDE AND ACETAMINOPHEN 2 TABLET: 5; 325 TABLET ORAL at 15:00

## 2024-12-13 ASSESSMENT — PAIN SCALES - GENERAL
PAINLEVEL_OUTOF10: 8
PAINLEVEL_OUTOF10: 5
PAINLEVEL_OUTOF10: 7
PAINLEVEL_OUTOF10: 10
PAINLEVEL_OUTOF10: 5
PAINLEVEL_OUTOF10: 7
PAINLEVEL_OUTOF10: 2
PAINLEVEL_OUTOF10: 9
PAINLEVEL_OUTOF10: 2
PAINLEVEL_OUTOF10: 7

## 2024-12-13 ASSESSMENT — PAIN DESCRIPTION - LOCATION
LOCATION: BACK
LOCATION: LEG
LOCATION: BACK
LOCATION: BACK

## 2024-12-13 ASSESSMENT — PAIN DESCRIPTION - ORIENTATION
ORIENTATION: RIGHT
ORIENTATION: LOWER
ORIENTATION: LOWER;MID
ORIENTATION: LOWER

## 2024-12-13 ASSESSMENT — PAIN DESCRIPTION - FREQUENCY: FREQUENCY: CONTINUOUS

## 2024-12-13 ASSESSMENT — PAIN DESCRIPTION - DESCRIPTORS
DESCRIPTORS: ACHING
DESCRIPTORS: ACHING;DISCOMFORT
DESCRIPTORS: ACHING;SHARP
DESCRIPTORS: ACHING

## 2024-12-13 ASSESSMENT — PAIN DESCRIPTION - ONSET: ONSET: ON-GOING

## 2024-12-13 NOTE — DISCHARGE INSTR - COC
the diagnosis listed and that she requires {Admit to Appropriate Level of Care:62437} for {GREATER/LESS:398398296} 30 days.     Update Admission H&P: {CHP DME Changes in HandP:960124220}    PHYSICIAN SIGNATURE:  {Esignature:[id number]}

## 2024-12-13 NOTE — PROCEDURES
PROCEDURE NOTE  Date: 12/12/2024   Name: Natty Suarez  YOB: 1940    Procedures    12 lead EKG completed. Results handed to Deneen CARBALLO

## 2024-12-13 NOTE — CARE COORDINATION
12/13/24, 1:14 PM EST    DISCHARGE ON GOING EVALUATION    [facility] day: 2  Location: -06/006-A Reason for admit: Closed compression fracture of body of L1 vertebra (HCC) [S32.010A]     Procedures: none    Imaging since last note: na    Barriers to Discharge: IR consulted, planning procedure with IR Monday, hold ASA for 5 days (last dose 12/10/2024),     PCP: Seamus Rosas APRN - CNP  Readmission Risk Score: 7    Patient Goals/Plan/Treatment Preferences: from home alone; will follow post op Monday procedure. Declined HH or needs with assessment.

## 2024-12-14 LAB
ALBUMIN SERPL BCG-MCNC: 3.2 G/DL (ref 3.5–5.1)
ALP SERPL-CCNC: 104 U/L (ref 38–126)
ALT SERPL W/O P-5'-P-CCNC: 121 U/L (ref 11–66)
ANION GAP SERPL CALC-SCNC: 12 MEQ/L (ref 8–16)
AST SERPL-CCNC: 159 U/L (ref 5–40)
BASOPHILS ABSOLUTE: 0.1 THOU/MM3 (ref 0–0.1)
BASOPHILS NFR BLD AUTO: 0.6 %
BILIRUB CONJ SERPL-MCNC: 0.3 MG/DL (ref 0.1–13.8)
BILIRUB SERPL-MCNC: 0.6 MG/DL (ref 0.3–1.2)
BUN SERPL-MCNC: 17 MG/DL (ref 7–22)
CALCIUM SERPL-MCNC: 8.8 MG/DL (ref 8.5–10.5)
CHLORIDE SERPL-SCNC: 101 MEQ/L (ref 98–111)
CO2 SERPL-SCNC: 23 MEQ/L (ref 23–33)
CREAT SERPL-MCNC: 0.6 MG/DL (ref 0.4–1.2)
DEPRECATED RDW RBC AUTO: 47.5 FL (ref 35–45)
EOSINOPHIL NFR BLD AUTO: 1.9 %
EOSINOPHILS ABSOLUTE: 0.2 THOU/MM3 (ref 0–0.4)
ERYTHROCYTE [DISTWIDTH] IN BLOOD BY AUTOMATED COUNT: 13.8 % (ref 11.5–14.5)
GFR SERPL CREATININE-BSD FRML MDRD: 89 ML/MIN/1.73M2
GLUCOSE SERPL-MCNC: 107 MG/DL (ref 70–108)
HCT VFR BLD AUTO: 44.5 % (ref 37–47)
HGB BLD-MCNC: 14.5 GM/DL (ref 12–16)
IMM GRANULOCYTES # BLD AUTO: 0.06 THOU/MM3 (ref 0–0.07)
IMM GRANULOCYTES NFR BLD AUTO: 0.5 %
LYMPHOCYTES ABSOLUTE: 2.2 THOU/MM3 (ref 1–4.8)
LYMPHOCYTES NFR BLD AUTO: 20 %
MCH RBC QN AUTO: 30.5 PG (ref 26–33)
MCHC RBC AUTO-ENTMCNC: 32.6 GM/DL (ref 32.2–35.5)
MCV RBC AUTO: 93.7 FL (ref 81–99)
MONOCYTES ABSOLUTE: 1.2 THOU/MM3 (ref 0.4–1.3)
MONOCYTES NFR BLD AUTO: 10.8 %
NEUTROPHILS ABSOLUTE: 7.3 THOU/MM3 (ref 1.8–7.7)
NEUTROPHILS NFR BLD AUTO: 66.2 %
NRBC BLD AUTO-RTO: 0 /100 WBC
PLATELET # BLD AUTO: 196 THOU/MM3 (ref 130–400)
PMV BLD AUTO: 11.4 FL (ref 9.4–12.4)
POTASSIUM SERPL-SCNC: 3.6 MEQ/L (ref 3.5–5.2)
PROT SERPL-MCNC: 6 G/DL (ref 6.1–8)
RBC # BLD AUTO: 4.75 MILL/MM3 (ref 4.2–5.4)
SODIUM SERPL-SCNC: 136 MEQ/L (ref 135–145)
WBC # BLD AUTO: 11.1 THOU/MM3 (ref 4.8–10.8)

## 2024-12-14 PROCEDURE — 99232 SBSQ HOSP IP/OBS MODERATE 35: CPT

## 2024-12-14 PROCEDURE — 6370000000 HC RX 637 (ALT 250 FOR IP)

## 2024-12-14 PROCEDURE — 36415 COLL VENOUS BLD VENIPUNCTURE: CPT

## 2024-12-14 PROCEDURE — 85025 COMPLETE CBC W/AUTO DIFF WBC: CPT

## 2024-12-14 PROCEDURE — 80076 HEPATIC FUNCTION PANEL: CPT

## 2024-12-14 PROCEDURE — 1200000000 HC SEMI PRIVATE

## 2024-12-14 PROCEDURE — 6370000000 HC RX 637 (ALT 250 FOR IP): Performed by: PHYSICIAN ASSISTANT

## 2024-12-14 PROCEDURE — 80048 BASIC METABOLIC PNL TOTAL CA: CPT

## 2024-12-14 PROCEDURE — 2580000003 HC RX 258

## 2024-12-14 RX ADMIN — OXYCODONE HYDROCHLORIDE AND ACETAMINOPHEN 2 TABLET: 5; 325 TABLET ORAL at 20:06

## 2024-12-14 RX ADMIN — SODIUM CHLORIDE, PRESERVATIVE FREE 10 ML: 5 INJECTION INTRAVENOUS at 20:03

## 2024-12-14 RX ADMIN — ATORVASTATIN CALCIUM 40 MG: 40 TABLET, FILM COATED ORAL at 09:19

## 2024-12-14 RX ADMIN — POTASSIUM BICARBONATE 20 MEQ: 782 TABLET, EFFERVESCENT ORAL at 09:19

## 2024-12-14 RX ADMIN — HYDROCHLOROTHIAZIDE 25 MG: 25 TABLET ORAL at 09:20

## 2024-12-14 RX ADMIN — AMLODIPINE BESYLATE 10 MG: 10 TABLET ORAL at 09:19

## 2024-12-14 RX ADMIN — SODIUM CHLORIDE, PRESERVATIVE FREE 10 ML: 5 INJECTION INTRAVENOUS at 09:20

## 2024-12-14 RX ADMIN — LEVOTHYROXINE SODIUM 50 MCG: 0.05 TABLET ORAL at 04:15

## 2024-12-14 RX ADMIN — METOPROLOL SUCCINATE 100 MG: 100 TABLET, EXTENDED RELEASE ORAL at 09:19

## 2024-12-14 RX ADMIN — OXYCODONE HYDROCHLORIDE AND ACETAMINOPHEN 2 TABLET: 5; 325 TABLET ORAL at 04:14

## 2024-12-14 RX ADMIN — ISOSORBIDE MONONITRATE 30 MG: 30 TABLET, EXTENDED RELEASE ORAL at 09:19

## 2024-12-14 RX ADMIN — POLYETHYLENE GLYCOL 3350 17 G: 17 POWDER, FOR SOLUTION ORAL at 09:19

## 2024-12-14 RX ADMIN — OXYCODONE HYDROCHLORIDE AND ACETAMINOPHEN 2 TABLET: 5; 325 TABLET ORAL at 09:19

## 2024-12-14 RX ADMIN — ACETAMINOPHEN 650 MG: 325 TABLET ORAL at 11:41

## 2024-12-14 RX ADMIN — OXYCODONE HYDROCHLORIDE AND ACETAMINOPHEN 2 TABLET: 5; 325 TABLET ORAL at 15:08

## 2024-12-14 ASSESSMENT — PAIN SCALES - GENERAL
PAINLEVEL_OUTOF10: 5
PAINLEVEL_OUTOF10: 8
PAINLEVEL_OUTOF10: 5
PAINLEVEL_OUTOF10: 7
PAINLEVEL_OUTOF10: 6
PAINLEVEL_OUTOF10: 5
PAINLEVEL_OUTOF10: 4
PAINLEVEL_OUTOF10: 6
PAINLEVEL_OUTOF10: 4
PAINLEVEL_OUTOF10: 5
PAINLEVEL_OUTOF10: 8
PAINLEVEL_OUTOF10: 5
PAINLEVEL_OUTOF10: 7

## 2024-12-14 ASSESSMENT — PATIENT HEALTH QUESTIONNAIRE - PHQ9
2. FEELING DOWN, DEPRESSED OR HOPELESS: SEVERAL DAYS
SUM OF ALL RESPONSES TO PHQ9 QUESTIONS 1 & 2: 1
SUM OF ALL RESPONSES TO PHQ QUESTIONS 1-9: 1
1. LITTLE INTEREST OR PLEASURE IN DOING THINGS: NOT AT ALL
SUM OF ALL RESPONSES TO PHQ QUESTIONS 1-9: 1

## 2024-12-14 ASSESSMENT — PAIN - FUNCTIONAL ASSESSMENT
PAIN_FUNCTIONAL_ASSESSMENT: PREVENTS OR INTERFERES SOME ACTIVE ACTIVITIES AND ADLS

## 2024-12-14 ASSESSMENT — PAIN DESCRIPTION - PAIN TYPE
TYPE: ACUTE PAIN

## 2024-12-14 ASSESSMENT — PAIN DESCRIPTION - DESCRIPTORS
DESCRIPTORS: ACHING

## 2024-12-14 ASSESSMENT — PAIN DESCRIPTION - ORIENTATION
ORIENTATION: LOWER;MID
ORIENTATION: LOWER;MID;RIGHT

## 2024-12-14 ASSESSMENT — PAIN DESCRIPTION - ONSET
ONSET: ON-GOING
ONSET: ON-GOING

## 2024-12-14 ASSESSMENT — LIFESTYLE VARIABLES
HOW MANY STANDARD DRINKS CONTAINING ALCOHOL DO YOU HAVE ON A TYPICAL DAY: PATIENT DOES NOT DRINK
HOW OFTEN DO YOU HAVE A DRINK CONTAINING ALCOHOL: NEVER

## 2024-12-14 ASSESSMENT — PAIN DESCRIPTION - FREQUENCY
FREQUENCY: CONTINUOUS
FREQUENCY: CONTINUOUS

## 2024-12-14 ASSESSMENT — PAIN DESCRIPTION - LOCATION
LOCATION: BACK
LOCATION: BACK
LOCATION: BACK;KNEE
LOCATION: HEAD
LOCATION: BACK

## 2024-12-14 NOTE — RT PROTOCOL NOTE
RT Inhaler-Nebulizer Bronchodilator Protocol Note    There is a bronchodilator order in the chart from a provider indicating to follow the RT Bronchodilator Protocol and there is an “Initiate RT Inhaler-Nebulizer Bronchodilator Protocol” order as well (see protocol at bottom of note).    CXR Findings:  XR CHEST PORTABLE    Result Date: 12/12/2024  1. Diffuse interstitial thickening secondary to pneumonitis or fluid overload. 2. Bilateral lower lobe platelike atelectasis and/or scarring. 3. No consolidative changes or large pleural effusion. This document has been electronically signed by: Eleonora Ash DO on 12/12/2024 09:23 PM      The findings from the last RT Protocol Assessment were as follows:   History Pulmonary Disease: Chronic pulmonary disease  Respiratory Pattern: Regular pattern and RR 12-20 bpm  Breath Sounds: Clear breath sounds  Cough: Strong, productive  Indication for Bronchodilator Therapy: Decreased or absent breath sounds, On home bronchodilators  Bronchodilator Assessment Score: 3    Aerosolized bronchodilator medication orders have been revised according to the RT Inhaler-Nebulizer Bronchodilator Protocol below.    Respiratory Therapist to perform RT Therapy Protocol Assessment initially then follow the protocol.  Repeat RT Therapy Protocol Assessment PRN for score 0-3 or on second treatment, BID, and PRN for scores above 3.    No Indications - adjust the frequency to every 6 hours PRN wheezing or bronchospasm, if no treatments needed after 48 hours then discontinue using Per Protocol order mode.     If indication present, adjust the RT bronchodilator orders based on the Bronchodilator Assessment Score as indicated below.  Use Inhaler orders unless patient has one or more of the following: on home nebulizer, not able to hold breath for 10 seconds, is not alert and oriented, cannot activate and use MDI correctly, or respiratory rate 25 breaths per minute or more, then use the equivalent

## 2024-12-15 LAB
ANION GAP SERPL CALC-SCNC: 8 MEQ/L (ref 8–16)
BASOPHILS ABSOLUTE: 0.1 THOU/MM3 (ref 0–0.1)
BASOPHILS NFR BLD AUTO: 0.7 %
BUN SERPL-MCNC: 16 MG/DL (ref 7–22)
CALCIUM SERPL-MCNC: 8.9 MG/DL (ref 8.5–10.5)
CHLORIDE SERPL-SCNC: 99 MEQ/L (ref 98–111)
CO2 SERPL-SCNC: 28 MEQ/L (ref 23–33)
CREAT SERPL-MCNC: 0.8 MG/DL (ref 0.4–1.2)
DEPRECATED RDW RBC AUTO: 45.9 FL (ref 35–45)
EOSINOPHIL NFR BLD AUTO: 1.3 %
EOSINOPHILS ABSOLUTE: 0.1 THOU/MM3 (ref 0–0.4)
ERYTHROCYTE [DISTWIDTH] IN BLOOD BY AUTOMATED COUNT: 13.4 % (ref 11.5–14.5)
GFR SERPL CREATININE-BSD FRML MDRD: 73 ML/MIN/1.73M2
GLUCOSE SERPL-MCNC: 105 MG/DL (ref 70–108)
HCT VFR BLD AUTO: 43.2 % (ref 37–47)
HGB BLD-MCNC: 14 GM/DL (ref 12–16)
IMM GRANULOCYTES # BLD AUTO: 0.03 THOU/MM3 (ref 0–0.07)
IMM GRANULOCYTES NFR BLD AUTO: 0.3 %
LYMPHOCYTES ABSOLUTE: 1.9 THOU/MM3 (ref 1–4.8)
LYMPHOCYTES NFR BLD AUTO: 22 %
MAGNESIUM SERPL-MCNC: 2.2 MG/DL (ref 1.6–2.4)
MCH RBC QN AUTO: 30.2 PG (ref 26–33)
MCHC RBC AUTO-ENTMCNC: 32.4 GM/DL (ref 32.2–35.5)
MCV RBC AUTO: 93.1 FL (ref 81–99)
MONOCYTES ABSOLUTE: 0.9 THOU/MM3 (ref 0.4–1.3)
MONOCYTES NFR BLD AUTO: 10.6 %
NEUTROPHILS ABSOLUTE: 5.7 THOU/MM3 (ref 1.8–7.7)
NEUTROPHILS NFR BLD AUTO: 65.1 %
NRBC BLD AUTO-RTO: 0 /100 WBC
PLATELET # BLD AUTO: 197 THOU/MM3 (ref 130–400)
PMV BLD AUTO: 11.4 FL (ref 9.4–12.4)
POTASSIUM SERPL-SCNC: 3.6 MEQ/L (ref 3.5–5.2)
RBC # BLD AUTO: 4.64 MILL/MM3 (ref 4.2–5.4)
SODIUM SERPL-SCNC: 135 MEQ/L (ref 135–145)
WBC # BLD AUTO: 8.7 THOU/MM3 (ref 4.8–10.8)

## 2024-12-15 PROCEDURE — 36415 COLL VENOUS BLD VENIPUNCTURE: CPT

## 2024-12-15 PROCEDURE — 85025 COMPLETE CBC W/AUTO DIFF WBC: CPT

## 2024-12-15 PROCEDURE — 99232 SBSQ HOSP IP/OBS MODERATE 35: CPT

## 2024-12-15 PROCEDURE — 1200000000 HC SEMI PRIVATE

## 2024-12-15 PROCEDURE — 2580000003 HC RX 258

## 2024-12-15 PROCEDURE — 6370000000 HC RX 637 (ALT 250 FOR IP): Performed by: PHYSICIAN ASSISTANT

## 2024-12-15 PROCEDURE — 80048 BASIC METABOLIC PNL TOTAL CA: CPT

## 2024-12-15 PROCEDURE — 83735 ASSAY OF MAGNESIUM: CPT

## 2024-12-15 PROCEDURE — 6370000000 HC RX 637 (ALT 250 FOR IP)

## 2024-12-15 RX ORDER — FENTANYL CITRATE 50 UG/ML
50 INJECTION, SOLUTION INTRAMUSCULAR; INTRAVENOUS ONCE
Status: CANCELLED | OUTPATIENT
Start: 2024-12-15 | End: 2024-12-15

## 2024-12-15 RX ORDER — SODIUM CHLORIDE 450 MG/100ML
INJECTION, SOLUTION INTRAVENOUS CONTINUOUS
Status: CANCELLED | OUTPATIENT
Start: 2024-12-15

## 2024-12-15 RX ORDER — MIDAZOLAM HYDROCHLORIDE 1 MG/ML
1 INJECTION, SOLUTION INTRAMUSCULAR; INTRAVENOUS ONCE
Status: CANCELLED | OUTPATIENT
Start: 2024-12-15 | End: 2024-12-15

## 2024-12-15 RX ADMIN — ISOSORBIDE MONONITRATE 30 MG: 30 TABLET, EXTENDED RELEASE ORAL at 09:42

## 2024-12-15 RX ADMIN — METOPROLOL SUCCINATE 100 MG: 100 TABLET, EXTENDED RELEASE ORAL at 09:42

## 2024-12-15 RX ADMIN — ATORVASTATIN CALCIUM 40 MG: 40 TABLET, FILM COATED ORAL at 09:42

## 2024-12-15 RX ADMIN — LEVOTHYROXINE SODIUM 50 MCG: 0.05 TABLET ORAL at 05:23

## 2024-12-15 RX ADMIN — SODIUM CHLORIDE, PRESERVATIVE FREE 10 ML: 5 INJECTION INTRAVENOUS at 09:43

## 2024-12-15 RX ADMIN — AMLODIPINE BESYLATE 10 MG: 10 TABLET ORAL at 09:42

## 2024-12-15 RX ADMIN — OXYCODONE HYDROCHLORIDE AND ACETAMINOPHEN 2 TABLET: 5; 325 TABLET ORAL at 15:29

## 2024-12-15 RX ADMIN — OXYCODONE HYDROCHLORIDE AND ACETAMINOPHEN 2 TABLET: 5; 325 TABLET ORAL at 10:10

## 2024-12-15 RX ADMIN — OXYCODONE HYDROCHLORIDE AND ACETAMINOPHEN 2 TABLET: 5; 325 TABLET ORAL at 00:31

## 2024-12-15 RX ADMIN — OXYCODONE HYDROCHLORIDE AND ACETAMINOPHEN 2 TABLET: 5; 325 TABLET ORAL at 05:22

## 2024-12-15 RX ADMIN — OXYCODONE HYDROCHLORIDE AND ACETAMINOPHEN 2 TABLET: 5; 325 TABLET ORAL at 20:45

## 2024-12-15 RX ADMIN — SODIUM CHLORIDE, PRESERVATIVE FREE 10 ML: 5 INJECTION INTRAVENOUS at 20:46

## 2024-12-15 ASSESSMENT — PAIN DESCRIPTION - ORIENTATION
ORIENTATION: LOWER

## 2024-12-15 ASSESSMENT — PAIN SCALES - GENERAL
PAINLEVEL_OUTOF10: 8
PAINLEVEL_OUTOF10: 8
PAINLEVEL_OUTOF10: 5
PAINLEVEL_OUTOF10: 8
PAINLEVEL_OUTOF10: 5
PAINLEVEL_OUTOF10: 4
PAINLEVEL_OUTOF10: 8
PAINLEVEL_OUTOF10: 8
PAINLEVEL_OUTOF10: 9
PAINLEVEL_OUTOF10: 4

## 2024-12-15 ASSESSMENT — PAIN DESCRIPTION - DESCRIPTORS
DESCRIPTORS: ACHING;SHARP

## 2024-12-15 ASSESSMENT — PAIN DESCRIPTION - LOCATION
LOCATION: BACK

## 2024-12-16 ENCOUNTER — APPOINTMENT (OUTPATIENT)
Dept: INTERVENTIONAL RADIOLOGY/VASCULAR | Age: 84
DRG: 516 | End: 2024-12-16
Attending: STUDENT IN AN ORGANIZED HEALTH CARE EDUCATION/TRAINING PROGRAM
Payer: MEDICARE

## 2024-12-16 LAB
ANION GAP SERPL CALC-SCNC: 10 MEQ/L (ref 8–16)
BASOPHILS ABSOLUTE: 0.1 THOU/MM3 (ref 0–0.1)
BASOPHILS NFR BLD AUTO: 0.7 %
BUN SERPL-MCNC: 15 MG/DL (ref 7–22)
CALCIUM SERPL-MCNC: 8.7 MG/DL (ref 8.5–10.5)
CHLORIDE SERPL-SCNC: 105 MEQ/L (ref 98–111)
CO2 SERPL-SCNC: 26 MEQ/L (ref 23–33)
CREAT SERPL-MCNC: 0.6 MG/DL (ref 0.4–1.2)
DEPRECATED RDW RBC AUTO: 46.5 FL (ref 35–45)
EOSINOPHIL NFR BLD AUTO: 2.1 %
EOSINOPHILS ABSOLUTE: 0.2 THOU/MM3 (ref 0–0.4)
ERYTHROCYTE [DISTWIDTH] IN BLOOD BY AUTOMATED COUNT: 13.5 % (ref 11.5–14.5)
GFR SERPL CREATININE-BSD FRML MDRD: 89 ML/MIN/1.73M2
GLUCOSE SERPL-MCNC: 92 MG/DL (ref 70–108)
HCT VFR BLD AUTO: 42 % (ref 37–47)
HGB BLD-MCNC: 13.5 GM/DL (ref 12–16)
IMM GRANULOCYTES # BLD AUTO: 0.02 THOU/MM3 (ref 0–0.07)
IMM GRANULOCYTES NFR BLD AUTO: 0.2 %
INR PPP: 0.96 (ref 0.85–1.13)
LYMPHOCYTES ABSOLUTE: 3.2 THOU/MM3 (ref 1–4.8)
LYMPHOCYTES NFR BLD AUTO: 35.7 %
MAGNESIUM SERPL-MCNC: 2.2 MG/DL (ref 1.6–2.4)
MCH RBC QN AUTO: 30.1 PG (ref 26–33)
MCHC RBC AUTO-ENTMCNC: 32.1 GM/DL (ref 32.2–35.5)
MCV RBC AUTO: 93.5 FL (ref 81–99)
MONOCYTES ABSOLUTE: 0.8 THOU/MM3 (ref 0.4–1.3)
MONOCYTES NFR BLD AUTO: 9.2 %
NEUTROPHILS ABSOLUTE: 4.7 THOU/MM3 (ref 1.8–7.7)
NEUTROPHILS NFR BLD AUTO: 52.1 %
NRBC BLD AUTO-RTO: 0 /100 WBC
PLATELET # BLD AUTO: 207 THOU/MM3 (ref 130–400)
PMV BLD AUTO: 11.4 FL (ref 9.4–12.4)
POTASSIUM SERPL-SCNC: 3.5 MEQ/L (ref 3.5–5.2)
RBC # BLD AUTO: 4.49 MILL/MM3 (ref 4.2–5.4)
SODIUM SERPL-SCNC: 141 MEQ/L (ref 135–145)
WBC # BLD AUTO: 9.1 THOU/MM3 (ref 4.8–10.8)

## 2024-12-16 PROCEDURE — 99232 SBSQ HOSP IP/OBS MODERATE 35: CPT

## 2024-12-16 PROCEDURE — 6360000002 HC RX W HCPCS: Performed by: RADIOLOGY

## 2024-12-16 PROCEDURE — 1200000000 HC SEMI PRIVATE

## 2024-12-16 PROCEDURE — 0PU43JZ SUPPLEMENT THORACIC VERTEBRA WITH SYNTHETIC SUBSTITUTE, PERCUTANEOUS APPROACH: ICD-10-PCS | Performed by: RADIOLOGY

## 2024-12-16 PROCEDURE — C1713 ANCHOR/SCREW BN/BN,TIS/BN: HCPCS

## 2024-12-16 PROCEDURE — 36415 COLL VENOUS BLD VENIPUNCTURE: CPT

## 2024-12-16 PROCEDURE — 22510 PERQ CERVICOTHORACIC INJECT: CPT

## 2024-12-16 PROCEDURE — 6370000000 HC RX 637 (ALT 250 FOR IP)

## 2024-12-16 PROCEDURE — 2580000003 HC RX 258: Performed by: RADIOLOGY

## 2024-12-16 PROCEDURE — 85025 COMPLETE CBC W/AUTO DIFF WBC: CPT

## 2024-12-16 PROCEDURE — 0QU03JZ SUPPLEMENT LUMBAR VERTEBRA WITH SYNTHETIC SUBSTITUTE, PERCUTANEOUS APPROACH: ICD-10-PCS | Performed by: RADIOLOGY

## 2024-12-16 PROCEDURE — 6370000000 HC RX 637 (ALT 250 FOR IP): Performed by: PHYSICIAN ASSISTANT

## 2024-12-16 PROCEDURE — 85610 PROTHROMBIN TIME: CPT

## 2024-12-16 PROCEDURE — 83735 ASSAY OF MAGNESIUM: CPT

## 2024-12-16 PROCEDURE — 22512 VERTEBROPLASTY ADDL INJECT: CPT

## 2024-12-16 PROCEDURE — 97530 THERAPEUTIC ACTIVITIES: CPT

## 2024-12-16 PROCEDURE — 80048 BASIC METABOLIC PNL TOTAL CA: CPT

## 2024-12-16 RX ORDER — MIDAZOLAM HYDROCHLORIDE 1 MG/ML
0.5 INJECTION, SOLUTION INTRAMUSCULAR; INTRAVENOUS ONCE
Status: COMPLETED | OUTPATIENT
Start: 2024-12-16 | End: 2024-12-16

## 2024-12-16 RX ORDER — MIDAZOLAM HYDROCHLORIDE 1 MG/ML
1 INJECTION, SOLUTION INTRAMUSCULAR; INTRAVENOUS ONCE
Status: COMPLETED | OUTPATIENT
Start: 2024-12-16 | End: 2024-12-16

## 2024-12-16 RX ORDER — LIDOCAINE HYDROCHLORIDE 20 MG/ML
15 INJECTION, SOLUTION INFILTRATION; PERINEURAL ONCE
Status: COMPLETED | OUTPATIENT
Start: 2024-12-16 | End: 2024-12-16

## 2024-12-16 RX ORDER — FENTANYL CITRATE 50 UG/ML
50 INJECTION, SOLUTION INTRAMUSCULAR; INTRAVENOUS ONCE
Status: COMPLETED | OUTPATIENT
Start: 2024-12-16 | End: 2024-12-16

## 2024-12-16 RX ORDER — FENTANYL CITRATE 50 UG/ML
25 INJECTION, SOLUTION INTRAMUSCULAR; INTRAVENOUS ONCE
Status: COMPLETED | OUTPATIENT
Start: 2024-12-16 | End: 2024-12-16

## 2024-12-16 RX ORDER — SODIUM CHLORIDE 450 MG/100ML
INJECTION, SOLUTION INTRAVENOUS CONTINUOUS
Status: DISCONTINUED | OUTPATIENT
Start: 2024-12-16 | End: 2024-12-17 | Stop reason: HOSPADM

## 2024-12-16 RX ADMIN — LIDOCAINE HYDROCHLORIDE 15 ML: 20 INJECTION, SOLUTION INFILTRATION; PERINEURAL at 09:53

## 2024-12-16 RX ADMIN — AMLODIPINE BESYLATE 10 MG: 10 TABLET ORAL at 09:08

## 2024-12-16 RX ADMIN — MIDAZOLAM 0.5 MG: 1 INJECTION INTRAMUSCULAR; INTRAVENOUS at 09:47

## 2024-12-16 RX ADMIN — MIDAZOLAM 0.5 MG: 1 INJECTION INTRAMUSCULAR; INTRAVENOUS at 09:44

## 2024-12-16 RX ADMIN — CEFAZOLIN 2000 MG: 2 INJECTION, POWDER, FOR SOLUTION INTRAVENOUS at 11:09

## 2024-12-16 RX ADMIN — OXYCODONE HYDROCHLORIDE AND ACETAMINOPHEN 2 TABLET: 5; 325 TABLET ORAL at 20:13

## 2024-12-16 RX ADMIN — OXYCODONE HYDROCHLORIDE AND ACETAMINOPHEN 2 TABLET: 5; 325 TABLET ORAL at 06:20

## 2024-12-16 RX ADMIN — SODIUM CHLORIDE: 4.5 INJECTION, SOLUTION INTRAVENOUS at 06:19

## 2024-12-16 RX ADMIN — OXYCODONE HYDROCHLORIDE AND ACETAMINOPHEN 2 TABLET: 5; 325 TABLET ORAL at 01:28

## 2024-12-16 RX ADMIN — FENTANYL CITRATE 25 MCG: 50 INJECTION, SOLUTION INTRAMUSCULAR; INTRAVENOUS at 09:52

## 2024-12-16 RX ADMIN — POTASSIUM BICARBONATE 20 MEQ: 782 TABLET, EFFERVESCENT ORAL at 20:13

## 2024-12-16 RX ADMIN — ATORVASTATIN CALCIUM 40 MG: 40 TABLET, FILM COATED ORAL at 09:08

## 2024-12-16 RX ADMIN — FENTANYL CITRATE 25 MCG: 50 INJECTION, SOLUTION INTRAMUSCULAR; INTRAVENOUS at 09:44

## 2024-12-16 RX ADMIN — LEVOTHYROXINE SODIUM 50 MCG: 0.05 TABLET ORAL at 09:08

## 2024-12-16 RX ADMIN — MIDAZOLAM 0.5 MG: 1 INJECTION INTRAMUSCULAR; INTRAVENOUS at 09:59

## 2024-12-16 RX ADMIN — FENTANYL CITRATE 25 MCG: 50 INJECTION, SOLUTION INTRAMUSCULAR; INTRAVENOUS at 09:47

## 2024-12-16 RX ADMIN — HYDROCHLOROTHIAZIDE 25 MG: 25 TABLET ORAL at 09:08

## 2024-12-16 RX ADMIN — OXYCODONE HYDROCHLORIDE AND ACETAMINOPHEN 2 TABLET: 5; 325 TABLET ORAL at 11:18

## 2024-12-16 RX ADMIN — ISOSORBIDE MONONITRATE 30 MG: 30 TABLET, EXTENDED RELEASE ORAL at 09:08

## 2024-12-16 RX ADMIN — MIDAZOLAM 0.5 MG: 1 INJECTION INTRAMUSCULAR; INTRAVENOUS at 09:52

## 2024-12-16 RX ADMIN — FENTANYL CITRATE 25 MCG: 50 INJECTION, SOLUTION INTRAMUSCULAR; INTRAVENOUS at 09:59

## 2024-12-16 RX ADMIN — OXYCODONE HYDROCHLORIDE AND ACETAMINOPHEN 2 TABLET: 5; 325 TABLET ORAL at 15:54

## 2024-12-16 ASSESSMENT — PAIN SCALES - WONG BAKER: WONGBAKER_NUMERICALRESPONSE: NO HURT

## 2024-12-16 ASSESSMENT — PAIN DESCRIPTION - ORIENTATION
ORIENTATION: MID;LOWER
ORIENTATION: MID
ORIENTATION: LOWER;MID
ORIENTATION: LOWER;MID
ORIENTATION: MID;LOWER
ORIENTATION: MID

## 2024-12-16 ASSESSMENT — PAIN DESCRIPTION - DESCRIPTORS
DESCRIPTORS: ACHING;THROBBING;DISCOMFORT
DESCRIPTORS: ACHING;THROBBING;SHARP
DESCRIPTORS: ACHING;SHARP
DESCRIPTORS: SQUEEZING;SHOOTING
DESCRIPTORS: ACHING
DESCRIPTORS: DISCOMFORT

## 2024-12-16 ASSESSMENT — PAIN DESCRIPTION - LOCATION
LOCATION: BACK

## 2024-12-16 ASSESSMENT — PAIN SCALES - GENERAL
PAINLEVEL_OUTOF10: 8
PAINLEVEL_OUTOF10: 8
PAINLEVEL_OUTOF10: 7
PAINLEVEL_OUTOF10: 8
PAINLEVEL_OUTOF10: 8
PAINLEVEL_OUTOF10: 4
PAINLEVEL_OUTOF10: 5
PAINLEVEL_OUTOF10: 9

## 2024-12-16 NOTE — H&P
Formulation and discussion of sedation / procedure plans, risks, benefits, side effects and alternatives with patient and/or responsible adult completed.    History and Physical reviewed and unchanged.    Electronically signed by William Henriquez MD on 12/16/24 at 9:30 AM EST

## 2024-12-16 NOTE — H&P
[facility]  Sedation/Analgesia History & Physical    Pt Name: Natty Suarez  MRN: [id number]  YOB: 1940  Provider Performing Procedure: William Henriquez MD, MD  Primary Care Physician: Seamus Rosas APRN - CNP    Formulation and discussion of sedation / procedure plans, risks, benefits, side effects and alternatives with patient and/or responsible adult completed.    PRE-PROCEDURE   DNR-CCA/DNR-CC []Yes [x]No  Brief History/Pre-Procedure Diagnosis: pathologic fracture T12, L3          MEDICAL HISTORY  []CAD/Valve  []Liver Disease  []Lung Disease []Diabetes  []Hypertension []Renal Disease  []Additional information:       has a past medical history of Arthritis, Asthma, CAD (coronary artery disease), Cerebral artery occlusion with cerebral infarction (HCC), CHF (congestive heart failure) (HCC), COPD (chronic obstructive pulmonary disease) (HCC), Hyperlipidemia, Hypertension, Hypertrophic cardiomegaly, Pneumonia, and Thyroid disease.    SURGICAL HISTORY   has a past surgical history that includes Appendectomy and Hysterectomy.  Additional information:       ALLERGIES   Allergies as of 12/11/2024    (No Known Allergies)     Additional information:       MEDICATIONS   Coumadin Use Last 5 Days [x]No []Yes  Antiplatelet drug therapy use last 5 days  [x]No []Yes  Other anticoagulant use last 5 days  [x]No []Yes    Current Facility-Administered Medications:     0.45 % sodium chloride infusion, , IntraVENous, Continuous, William Henriquez MD, Last Rate: 20 mL/hr at 12/16/24 0619, New Bag at 12/16/24 0619    ceFAZolin (ANCEF) 2,000 mg in sterile water 20 mL IV syringe, 2,000 mg, IntraVENous, 60 Min Pre-Op, William Henriquez MD    fentaNYL (SUBLIMAZE) injection 50 mcg, 50 mcg, IntraVENous, Once, William Henriquez MD    midazolam (VERSED) injection 1 mg, 1 mg, IntraVENous, Once, William Henriquez MD    lidocaine 4 % external patch 2 patch, 2 patch, TransDERmal, Daily, Lupe Luz APRN - CNP, 2

## 2024-12-16 NOTE — PLAN OF CARE
Problem: Discharge Planning  Goal: Discharge to home or other facility with appropriate resources  12/12/2024 1229 by Gavi Elias LPN  Outcome: Progressing  Flowsheets (Taken 12/12/2024 1229)  Discharge to home or other facility with appropriate resources: Identify barriers to discharge with patient and caregiver     Problem: Chronic Conditions and Co-morbidities  Goal: Patient's chronic conditions and co-morbidity symptoms are monitored and maintained or improved  12/12/2024 1229 by Gavi Elias LPN  Outcome: Progressing  Flowsheets (Taken 12/12/2024 1229)  Care Plan - Patient's Chronic Conditions and Co-Morbidity Symptoms are Monitored and Maintained or Improved: Monitor and assess patient's chronic conditions and comorbid symptoms for stability, deterioration, or improvement     Problem: Pain  Goal: Verbalizes/displays adequate comfort level or baseline comfort level  12/12/2024 1229 by Gavi Elias LPN  Outcome: Progressing  Flowsheets (Taken 12/12/2024 1229)  Verbalizes/displays adequate comfort level or baseline comfort level: Encourage patient to monitor pain and request assistance     Problem: Safety - Adult  Goal: Free from fall injury  Outcome: Progressing  Flowsheets (Taken 12/12/2024 1229)  Free From Fall Injury: Instruct family/caregiver on patient safety   Care plan reviewed with patient.  Patient verbalized understanding of the plan of care and contribute to goal setting.     
  Problem: Discharge Planning  Goal: Discharge to home or other facility with appropriate resources  12/12/2024 2349 by Deneen Morales, RN  Outcome: Progressing  Flowsheets (Taken 12/12/2024 2349)  Discharge to home or other facility with appropriate resources:   Identify barriers to discharge with patient and caregiver   Arrange for needed discharge resources and transportation as appropriate   Identify discharge learning needs (meds, wound care, etc)     Problem: Chronic Conditions and Co-morbidities  Goal: Patient's chronic conditions and co-morbidity symptoms are monitored and maintained or improved  12/12/2024 2349 by Deneen Morales, RN  Outcome: Progressing  Flowsheets (Taken 12/12/2024 2349)  Care Plan - Patient's Chronic Conditions and Co-Morbidity Symptoms are Monitored and Maintained or Improved:   Monitor and assess patient's chronic conditions and comorbid symptoms for stability, deterioration, or improvement   Collaborate with multidisciplinary team to address chronic and comorbid conditions and prevent exacerbation or deterioration   Update acute care plan with appropriate goals if chronic or comorbid symptoms are exacerbated and prevent overall improvement and discharge     Problem: Pain  Goal: Verbalizes/displays adequate comfort level or baseline comfort level  12/12/2024 2349 by Deneen Morales, RN  Outcome: Progressing  Flowsheets (Taken 12/12/2024 2349)  Verbalizes/displays adequate comfort level or baseline comfort level:   Encourage patient to monitor pain and request assistance   Assess pain using appropriate pain scale   Administer analgesics based on type and severity of pain and evaluate response   Implement non-pharmacological measures as appropriate and evaluate response     Problem: Safety - Adult  Goal: Free from fall injury  12/12/2024 2349 by Deneen Morales, RN  Outcome: Progressing  Flowsheets (Taken 12/12/2024 2349)  Free From Fall Injury: Instruct family/caregiver on patient 
  Problem: Discharge Planning  Goal: Discharge to home or other facility with appropriate resources  12/13/2024 2130 by Deneen Morales, RN  Outcome: Progressing  Flowsheets (Taken 12/13/2024 2130)  Discharge to home or other facility with appropriate resources:   Identify barriers to discharge with patient and caregiver   Arrange for needed discharge resources and transportation as appropriate   Identify discharge learning needs (meds, wound care, etc)  12/13/2024 1828 by Lien Hutton RN  Outcome: Progressing  Flowsheets (Taken 12/13/2024 1828)  Discharge to home or other facility with appropriate resources: Identify barriers to discharge with patient and caregiver     Problem: Chronic Conditions and Co-morbidities  Goal: Patient's chronic conditions and co-morbidity symptoms are monitored and maintained or improved  12/13/2024 2130 by Deneen Morales, RN  Outcome: Progressing  Flowsheets (Taken 12/13/2024 2130)  Care Plan - Patient's Chronic Conditions and Co-Morbidity Symptoms are Monitored and Maintained or Improved:   Monitor and assess patient's chronic conditions and comorbid symptoms for stability, deterioration, or improvement   Collaborate with multidisciplinary team to address chronic and comorbid conditions and prevent exacerbation or deterioration   Update acute care plan with appropriate goals if chronic or comorbid symptoms are exacerbated and prevent overall improvement and discharge  12/13/2024 1828 by Lien Hutton RN  Outcome: Progressing  Flowsheets (Taken 12/13/2024 1828)  Care Plan - Patient's Chronic Conditions and Co-Morbidity Symptoms are Monitored and Maintained or Improved: Monitor and assess patient's chronic conditions and comorbid symptoms for stability, deterioration, or improvement     Problem: Pain  Goal: Verbalizes/displays adequate comfort level or baseline comfort level  12/13/2024 2130 by Deneen Morales, RN  Outcome: Progressing  Flowsheets (Taken 12/13/2024 
  Problem: Discharge Planning  Goal: Discharge to home or other facility with appropriate resources  12/15/2024 2130 by Sharron Wolf RN  Outcome: Progressing  Flowsheets (Taken 12/15/2024 2030)  Discharge to home or other facility with appropriate resources: Identify barriers to discharge with patient and caregiver  12/15/2024 0951 by Candice Flanagan RN  Outcome: Progressing  Flowsheets (Taken 12/15/2024 0944)  Discharge to home or other facility with appropriate resources: Identify barriers to discharge with patient and caregiver     Problem: Chronic Conditions and Co-morbidities  Goal: Patient's chronic conditions and co-morbidity symptoms are monitored and maintained or improved  12/15/2024 2130 by Sharron Wolf RN  Outcome: Progressing  Flowsheets (Taken 12/15/2024 2030)  Care Plan - Patient's Chronic Conditions and Co-Morbidity Symptoms are Monitored and Maintained or Improved: Monitor and assess patient's chronic conditions and comorbid symptoms for stability, deterioration, or improvement  12/15/2024 0951 by Candice Flanagan RN  Outcome: Progressing  Flowsheets (Taken 12/15/2024 0944)  Care Plan - Patient's Chronic Conditions and Co-Morbidity Symptoms are Monitored and Maintained or Improved: Monitor and assess patient's chronic conditions and comorbid symptoms for stability, deterioration, or improvement     Problem: Pain  Goal: Verbalizes/displays adequate comfort level or baseline comfort level  12/15/2024 2130 by Sharron Wolf RN  Outcome: Progressing  12/15/2024 0951 by Candice Flanagan RN  Outcome: Progressing     Problem: Safety - Adult  Goal: Free from fall injury  12/15/2024 2130 by Sharron Wolf RN  Outcome: Progressing  12/15/2024 0951 by Candice Flanagan RN  Outcome: Progressing     Problem: Skin/Tissue Integrity - Adult  Goal: Skin integrity remains intact  12/15/2024 2130 by Sharron Wolf RN  Outcome: Progressing  12/15/2024 0951 by Candice Flanagan RN  Outcome: Progressing     Problem: 
  Problem: Discharge Planning  Goal: Discharge to home or other facility with appropriate resources  Outcome: Progressing     Problem: Chronic Conditions and Co-morbidities  Goal: Patient's chronic conditions and co-morbidity symptoms are monitored and maintained or improved  Outcome: Progressing     Problem: Pain  Goal: Verbalizes/displays adequate comfort level or baseline comfort level  Outcome: Progressing     
  Problem: Discharge Planning  Goal: Discharge to home or other facility with appropriate resources  Outcome: Progressing  Flowsheets (Taken 12/13/2024 1828)  Discharge to home or other facility with appropriate resources: Identify barriers to discharge with patient and caregiver     Problem: Chronic Conditions and Co-morbidities  Goal: Patient's chronic conditions and co-morbidity symptoms are monitored and maintained or improved  Outcome: Progressing  Flowsheets (Taken 12/13/2024 1828)  Care Plan - Patient's Chronic Conditions and Co-Morbidity Symptoms are Monitored and Maintained or Improved: Monitor and assess patient's chronic conditions and comorbid symptoms for stability, deterioration, or improvement     Problem: Pain  Goal: Verbalizes/displays adequate comfort level or baseline comfort level  Outcome: Progressing  Flowsheets (Taken 12/13/2024 1828)  Verbalizes/displays adequate comfort level or baseline comfort level: Assess pain using appropriate pain scale     Problem: Safety - Adult  Goal: Free from fall injury  Outcome: Progressing  Flowsheets (Taken 12/13/2024 1828)  Free From Fall Injury: Instruct family/caregiver on patient safety     Problem: Skin/Tissue Integrity - Adult  Goal: Skin integrity remains intact  Outcome: Progressing  Flowsheets (Taken 12/13/2024 1828)  Skin Integrity Remains Intact: Assess vascular access sites hourly     Problem: Musculoskeletal - Adult  Goal: Return mobility to safest level of function  Outcome: Progressing  Flowsheets (Taken 12/13/2024 1828)  Return Mobility to Safest Level of Function: Assess patient stability and activity tolerance for standing, transferring and ambulating with or without assistive devices     Problem: Gastrointestinal - Adult  Goal: Maintains or returns to baseline bowel function  Outcome: Progressing  Flowsheets (Taken 12/13/2024 1828)  Maintains or returns to baseline bowel function: Assess bowel function     Problem: Genitourinary - 
  Problem: Discharge Planning  Goal: Discharge to home or other facility with appropriate resources  Outcome: Progressing  Flowsheets (Taken 12/14/2024 2052)  Discharge to home or other facility with appropriate resources:   Arrange for needed discharge resources and transportation as appropriate   Identify barriers to discharge with patient and caregiver   Identify discharge learning needs (meds, wound care, etc)     Problem: Chronic Conditions and Co-morbidities  Goal: Patient's chronic conditions and co-morbidity symptoms are monitored and maintained or improved  Outcome: Progressing  Flowsheets (Taken 12/14/2024 2052)  Care Plan - Patient's Chronic Conditions and Co-Morbidity Symptoms are Monitored and Maintained or Improved:   Monitor and assess patient's chronic conditions and comorbid symptoms for stability, deterioration, or improvement   Collaborate with multidisciplinary team to address chronic and comorbid conditions and prevent exacerbation or deterioration   Update acute care plan with appropriate goals if chronic or comorbid symptoms are exacerbated and prevent overall improvement and discharge     Problem: Pain  Goal: Verbalizes/displays adequate comfort level or baseline comfort level  Outcome: Progressing  Flowsheets (Taken 12/14/2024 2052)  Verbalizes/displays adequate comfort level or baseline comfort level:   Encourage patient to monitor pain and request assistance   Assess pain using appropriate pain scale   Administer analgesics based on type and severity of pain and evaluate response   Implement non-pharmacological measures as appropriate and evaluate response     Problem: Safety - Adult  Goal: Free from fall injury  Outcome: Progressing  Flowsheets (Taken 12/14/2024 2052)  Free From Fall Injury: Instruct family/caregiver on patient safety     Problem: Skin/Tissue Integrity - Adult  Goal: Skin integrity remains intact  Outcome: Progressing  Flowsheets (Taken 12/14/2024 2052)  Skin Integrity 
12/16/2024 1438)  Absence of urinary retention: Assess patient’s ability to void and empty bladder     Problem: Infection - Adult  Goal: Absence of infection at discharge  Outcome: Progressing     Problem: Metabolic/Fluid and Electrolytes - Adult  Goal: Hemodynamic stability and optimal renal function maintained  Outcome: Progressing     Problem: Hematologic - Adult  Goal: Maintains hematologic stability  Outcome: Progressing     
RN  Outcome: Progressing  Flowsheets (Taken 12/14/2024 2052)  Verbalizes/displays adequate comfort level or baseline comfort level:   Encourage patient to monitor pain and request assistance   Assess pain using appropriate pain scale   Administer analgesics based on type and severity of pain and evaluate response   Implement non-pharmacological measures as appropriate and evaluate response     Problem: Safety - Adult  Goal: Free from fall injury  12/15/2024 0951 by Candice Flanagan RN  Outcome: Progressing  12/14/2024 2052 by Ngozi Rojas RN  Outcome: Progressing  Flowsheets (Taken 12/14/2024 2052)  Free From Fall Injury: Instruct family/caregiver on patient safety     Problem: Skin/Tissue Integrity - Adult  Goal: Skin integrity remains intact  12/15/2024 0951 by Candice Flanagan RN  Outcome: Progressing  12/14/2024 2052 by Ngozi Rojas RN  Outcome: Progressing  Flowsheets (Taken 12/14/2024 2052)  Skin Integrity Remains Intact: Monitor for areas of redness and/or skin breakdown     Problem: Musculoskeletal - Adult  Goal: Return mobility to safest level of function  12/15/2024 0951 by Candice Flanagan RN  Outcome: Progressing  Flowsheets (Taken 12/15/2024 0944)  Return Mobility to Safest Level of Function: Assess patient stability and activity tolerance for standing, transferring and ambulating with or without assistive devices  12/14/2024 2052 by Ngozi Rojas RN  Outcome: Progressing  Flowsheets (Taken 12/14/2024 2052)  Return Mobility to Safest Level of Function:   Assess patient stability and activity tolerance for standing, transferring and ambulating with or without assistive devices   Assist with transfers and ambulation using safe patient handling equipment as needed   Ensure adequate protection for wounds/incisions during mobilization   Instruct patient/family in ordered activity level     Problem: Gastrointestinal - Adult  Goal: Maintains or returns to baseline bowel function  12/15/2024 0951

## 2024-12-16 NOTE — OP NOTE
Department of Radiology  Post Procedure Progress Note      Pre-Procedure Diagnosis:  pathologic fracture T12, L3    Procedure Performed:  vertebroplasty     Anesthesia: local / versed and fentanyl    Findings: successful    Immediate Complications:  None    Estimated Blood Loss: minimal    SEE DICTATED PROCEDURE NOTE FOR COMPLETE DETAILS.    William Henriquez MD   12/16/2024 10:10 AM

## 2024-12-16 NOTE — CARE COORDINATION
12/16/24, 8:52 AM EST    DISCHARGE ON GOING EVALUATION    [facility] day: 5  Location: -06/006-A Reason for admit: Closed compression fracture of body of L1 vertebra (HCC) [S32.010A]     Procedures: 12/16 planning T12 and L3. ASA on hold with plan for Monday IR procedure     Imaging since last note: none    Barriers to Discharge: NPO, consent for procedure    PCP: Seamus Rosas APRN - CNP  Readmission Risk Score: 10    Patient Goals/Plan/Treatment Preferences: from home alone; will follow post op Monday procedure. Declined HH or needs with assessment. Will follow post procedure.

## 2024-12-17 VITALS
RESPIRATION RATE: 17 BRPM | WEIGHT: 155.2 LBS | BODY MASS INDEX: 27.5 KG/M2 | TEMPERATURE: 98.1 F | OXYGEN SATURATION: 92 % | DIASTOLIC BLOOD PRESSURE: 76 MMHG | SYSTOLIC BLOOD PRESSURE: 132 MMHG | HEART RATE: 76 BPM | HEIGHT: 63 IN

## 2024-12-17 PROBLEM — G47.30 SLEEP APNEA: Status: ACTIVE | Noted: 2024-12-17

## 2024-12-17 LAB
ANION GAP SERPL CALC-SCNC: 10 MEQ/L (ref 8–16)
BUN SERPL-MCNC: 14 MG/DL (ref 7–22)
CALCIUM SERPL-MCNC: 8.8 MG/DL (ref 8.5–10.5)
CHLORIDE SERPL-SCNC: 104 MEQ/L (ref 98–111)
CO2 SERPL-SCNC: 26 MEQ/L (ref 23–33)
CREAT SERPL-MCNC: 0.8 MG/DL (ref 0.4–1.2)
GFR SERPL CREATININE-BSD FRML MDRD: 73 ML/MIN/1.73M2
GLUCOSE SERPL-MCNC: 91 MG/DL (ref 70–108)
POTASSIUM SERPL-SCNC: 4 MEQ/L (ref 3.5–5.2)
SODIUM SERPL-SCNC: 140 MEQ/L (ref 135–145)

## 2024-12-17 PROCEDURE — 97535 SELF CARE MNGMENT TRAINING: CPT

## 2024-12-17 PROCEDURE — 6370000000 HC RX 637 (ALT 250 FOR IP)

## 2024-12-17 PROCEDURE — 80048 BASIC METABOLIC PNL TOTAL CA: CPT

## 2024-12-17 PROCEDURE — 36415 COLL VENOUS BLD VENIPUNCTURE: CPT

## 2024-12-17 PROCEDURE — 99239 HOSP IP/OBS DSCHRG MGMT >30: CPT

## 2024-12-17 PROCEDURE — 97530 THERAPEUTIC ACTIVITIES: CPT

## 2024-12-17 PROCEDURE — 97116 GAIT TRAINING THERAPY: CPT

## 2024-12-17 PROCEDURE — 6370000000 HC RX 637 (ALT 250 FOR IP): Performed by: PHYSICIAN ASSISTANT

## 2024-12-17 RX ORDER — OXYCODONE AND ACETAMINOPHEN 5; 325 MG/1; MG/1
1 TABLET ORAL EVERY 6 HOURS PRN
Qty: 28 TABLET | Refills: 0 | Status: SHIPPED | OUTPATIENT
Start: 2024-12-17 | End: 2024-12-24

## 2024-12-17 RX ORDER — POTASSIUM CHLORIDE 20MEQ/15ML
20 LIQUID (ML) ORAL DAILY
Qty: 450 ML | Refills: 3 | Status: SHIPPED | OUTPATIENT
Start: 2024-12-17

## 2024-12-17 RX ORDER — LIDOCAINE 4 G/G
2 PATCH TOPICAL DAILY
Qty: 60 EACH | Refills: 0 | Status: SHIPPED | OUTPATIENT
Start: 2024-12-18 | End: 2025-01-17

## 2024-12-17 RX ADMIN — OXYCODONE HYDROCHLORIDE AND ACETAMINOPHEN 2 TABLET: 5; 325 TABLET ORAL at 14:14

## 2024-12-17 RX ADMIN — OXYCODONE HYDROCHLORIDE AND ACETAMINOPHEN 2 TABLET: 5; 325 TABLET ORAL at 00:58

## 2024-12-17 RX ADMIN — ISOSORBIDE MONONITRATE 30 MG: 30 TABLET, EXTENDED RELEASE ORAL at 10:09

## 2024-12-17 RX ADMIN — AMLODIPINE BESYLATE 10 MG: 10 TABLET ORAL at 10:09

## 2024-12-17 RX ADMIN — METOPROLOL SUCCINATE 100 MG: 100 TABLET, EXTENDED RELEASE ORAL at 10:08

## 2024-12-17 RX ADMIN — ATORVASTATIN CALCIUM 40 MG: 40 TABLET, FILM COATED ORAL at 10:09

## 2024-12-17 RX ADMIN — OXYCODONE HYDROCHLORIDE AND ACETAMINOPHEN 2 TABLET: 5; 325 TABLET ORAL at 10:08

## 2024-12-17 RX ADMIN — POLYETHYLENE GLYCOL 3350 17 G: 17 POWDER, FOR SOLUTION ORAL at 10:09

## 2024-12-17 RX ADMIN — LEVOTHYROXINE SODIUM 50 MCG: 0.05 TABLET ORAL at 06:11

## 2024-12-17 RX ADMIN — POTASSIUM BICARBONATE 20 MEQ: 782 TABLET, EFFERVESCENT ORAL at 10:09

## 2024-12-17 RX ADMIN — OXYCODONE HYDROCHLORIDE AND ACETAMINOPHEN 2 TABLET: 5; 325 TABLET ORAL at 06:11

## 2024-12-17 ASSESSMENT — PAIN DESCRIPTION - ORIENTATION
ORIENTATION: MID
ORIENTATION: MID

## 2024-12-17 ASSESSMENT — PAIN SCALES - GENERAL
PAINLEVEL_OUTOF10: 8
PAINLEVEL_OUTOF10: 7
PAINLEVEL_OUTOF10: 5
PAINLEVEL_OUTOF10: 7

## 2024-12-17 ASSESSMENT — PAIN DESCRIPTION - LOCATION
LOCATION: BACK
LOCATION: BACK

## 2024-12-17 ASSESSMENT — PAIN DESCRIPTION - DESCRIPTORS: DESCRIPTORS: ACHING

## 2024-12-17 NOTE — DISCHARGE SUMMARY
Hospital Medicine Discharge Summary      Patient Identification:   Natty Suarez   : 1940  MRN: [id number]   Account: [id number]      Patient's PCP: Seamus Rosas APRN - CNP    Admit Date: 2024     Discharge Date:   24    Admitting Physician: No admitting provider for patient encounter.     Discharge Physician: Shelley Jean,      Discharge Diagnoses:  Acute compression fractures of L3 and T12.  S/p vertebroplasty.    Spinal stenosis L3-S1.   Deconditioning.   Suspected sleep apnea.   Mild hypokalemia. Resolved.   S/p fall.  Noted.  CT head on arrival negative.  CAD.    CHF, HFpEF.  Not in exacerbation.  .  Primary hypertension.   Depression/anxiety.       The patient was seen and examined on day of discharge and this discharge summary is in conjunction with any daily progress note from day of discharge.    Hospital Course:   Natty Suarez is a 84 y.o. female admitted to [facility] on 2024 for fall episode and low back pain.        Initial HPI on admission. Natty Suarez is a 84 y.o. female with PMHx of CHF, COPD, CAD who presented to [facility] with chief complaint of low back pain following a fall. Patient arrived as a direct admission from [facility]. Patient reports that she was having trouble sleeping so she wanted to start decorating for [employer]. States that her decorations were in her closet and she was trying to pull them down but couldn't reach them. States that she had her exercise bike next to her closet so she stood on the pedal to try to reach the box and the pedal went out from under her causing her to fall on her bottom. Denies hitting her head or LOC. Reports mid back pain rated 7-8/10 and described as achy and burning. Denies saddle paraesthesias, radiculopathy, incontinence, or loss of function. Denies any other symptoms. Denies headache, dizziness, or lightheadedness. Denies fever or chills. Denies any recent illness. Denies chest pain or SOB.

## 2024-12-18 NOTE — PROGRESS NOTES
Hospitalist Progress Note      Patient:  Natty Suarez 84 y.o. female     : 1940  Unit/Bed:-06006-A  Date of Admission: 2024        ASSESSMENT AND PLAN    Active Problems  Compression fracture of L1 and L3, indeterminate age:   OSH CT Lumbar Spine notes inferior endplate compression deformity of vertebral body L3 with approximately 50% central height loss, superior endplate compression deformity of vertebral body L1 with 10-20% height loss.    CT interpretation of OSH images pending  MRI Lumbar spine with acute L3 20% compression fracture and T12 15% compared fracture  Ortho spine following.   no plans for kyphoplasty at this time.  May consider epidural and will discuss with patient tomorrow  TSLO brace per orthospine  Pain control: Lidocaine patches, PRN Morphine / percocet  PT and OT   NPO in case need for surgical intervention  Leukocytosis:   WBC 12.01 at OSH. Likely reactive due to above. No s/s of infection.   CBC in AM    Mechanical fall:   Reports falling after standing on the pedal of her exercise bike trying to reach [facility]. CT Lumbar spine as noted above. XR Pelvis without acute findings.   Fall precautions  PT/OT    CAD:   2023 Stress test: coronary calcifications visualized.   Hold ASA in case need for surgical intervention  Continue statin     HFpEF- HOCM vs LVH :   2023 Echo: EF 65%, moderate upper septal LV hypertrophy, grade 1 LV DD.   Continue hydrochlorothiazide, isosorbide  Daily weights, I&O     COPD:   Continue PRN albuterol     Essential HTN:   Continue amlodipine, hydrochlorothiazide, isosorbide, metoprolol with holding parameters     Hypothyroidism:   Continue levothyroxine     SERENE/Insomnia: Not currently on any home medications for this.     Tobacco use disorder:   Reports smoking 1 pack every 2-3 days.   Encourage cessation  Denies need for nicotine patch      LDA: []CVC / []PICC / []Midline / []Galindo / []Drains / []Mediport / [x]None  Antibiotics: 
    Hospitalist Progress Note    Patient:  Natty Suarez      Unit/Bed:7K-06/006-A    YOB: 1940    MRN: [id number]       Acct: [id number]     PCP: Seamus Rosas APRN - CNP    Date of Admission: 12/11/2024    Assessment/Plan:    Acute compression fractures of L3 and T12.      Spinal stenosis L3-S1.   -Orthopedic surgery following.  Discussed the case.  -  -IR the following, discussed the case with IR antibiotic surgery.  Patient is undergoing to vertebroplasty on Monday.  -Antiplatelet washout.  Monday scheduled IR procedure.-  -Supportive care.  Pain control.  -Daily BMP.  Will replete electrolytes as needed.  Suspected sleep apnea.  Patient is noted to having hypoxic event overnight while she was sleeping.  Patient reports snoring 2.  Discussed with patient regarding pulmonary consult and evaluation for sleep apnea, however patient states that she has had in the past evaluation and does not want CPAP/BiPAP.  She refused pulmonology consult and referral.  Will continue encouraging  Mild hypokalemia.  Likely related to poor oral intake.   - Potassium replacement protocol  -Additional potassium r daily 20 mill equivalent  -Daily BMP.  S/p fall.  Noted.  CT head on arrival negative.  CAD.  Risk stratification.  Will continue statin however will hold ASA since patient is awaiting procedure.  CHF, HFpEF.  Not in exacerbation.  GDMT as able.  Daily weights ARIC's.  Primary hypertension.  Home meds are resumed.  Depression/anxiety.  Patient denies recent exacerbation.  Off medications for now.  Will continue monitoring.        Expected discharge date: TBD    Disposition:    [] Home       [] TCU       [] Rehab       [] Psych       [] SNF       [] Long Term Care Facility       [x] Other-    Chief Complaint: Back pain    Hospital Course: Initial HPI   Patient states she is very hungry and upset because no doctor has been in. I reviewed that orthospine was in early this morning and that MRI was ordered- 
    Hospitalist Progress Note    Patient:  Natty Suarez      Unit/Bed:7K-06/006-A    YOB: 1940    MRN: [id number]       Acct: [id number]     PCP: Seamus Rosas APRN - CNP    Date of Admission: 12/11/2024    Assessment/Plan:    Acute T12, and L3 compression fracture.    Spinal stenosis L3-S1.   -Orthopedic surgery and IR following the case, discussed with plan.  -Antiplatelet washout.  Monday scheduled IR procedure.-  -Supportive care.  Pain control.  -Daily BMP.  Will replete electrolytes as needed.  Mild hypokalemia.  Likely related to poor oral intake.   - Potassium replacement protocol  -Additional potassium r daily 20 mill equivalent  -Daily BMP.  S/p fall.  Noted.  CT head on arrival negative.  CAD.  Risk stratification.  Will continue statin however will hold ASA since patient is awaiting procedure.  CHF, HFpEF.  Not in exacerbation.  GDMT as able.  Daily weights ARIC's.  Primary hypertension.  Home meds are resumed.  Depression/anxiety.  Patient denies recent exacerbation.  Off medications for now.  Will continue monitoring.        Expected discharge date: TBD    Disposition:    [] Home       [] TCU       [] Rehab       [] Psych       [] SNF       [] Long Term Care Facility       [x] Other-    Chief Complaint: Back pain    Hospital Course: Initial HPI   Patient states she is very hungry and upset because no doctor has been in. I reviewed that orthospine was in early this morning and that MRI was ordered- waiting for further input from their team on if kyphoplasty will be pursued.  Patient states her pain is currently 5/10. Denies any bowel or bladder issues.       Subjective (past 24 hours): I took over care of this patient this morning.  Patient seen at the bedside this morning.  Patient still complains of back pain but reports some improvement since admission.  Reviewed labs.  Patient is being followed with orthopedic surgery, discussed the case.  Patient is going to IR for epidural 
    Hospitalist Progress Note    Patient:  Natty Suarez      Unit/Bed:7K-06/006-A    YOB: 1940    MRN: [id number]       Acct: [id number]     PCP: Seamus Rosas APRN - CNP    Date of Admission: 12/11/2024    Assessment/Plan:    Acute compression fractures of L3 and T12.      Spinal stenosis L3-S1.   -Awaiting surgery-kyphoplasty.  Discussed the case with IR and orthopedic surgery.    -PT/OT as able after surgery likely tomorrow morning.  -Orthopedic surgery following.  --Supportive care.  Pain control.  -Daily BMP.  Will replete electrolytes as needed.  Deconditioning.  Due to multiple comorbidities.  For now, awaiting kyphoplasty.  PT OT as able, likely kyphoplasty.  Patient may need rehab.  SNF placement.  Suspected sleep apnea.  Patient is noted to having hypoxic event overnight while she was sleeping.  Patient reports snoring 2.  Discussed with patient regarding pulmonary consult and evaluation for sleep apnea, however patient states that she has had in the past evaluation and does not want CPAP/BiPAP.  She refused pulmonology consult and referral.  Will continue encouraging  Mild hypokalemia.  Likely related to poor oral intake.   - Potassium replacement protocol  -Additional potassium r daily 20 mill equivalent  -Daily BMP.  S/p fall.  Noted.  CT head on arrival negative.  CAD.  Risk stratification.  Will continue statin however will hold ASA since patient is awaiting procedure.  CHF, HFpEF.  Not in exacerbation.  GDMT as able.  Daily weights ARIC's.  Primary hypertension.  Home meds are resumed.  Depression/anxiety.  Patient denies recent exacerbation.  Off medications for now.  Will continue monitoring.        Expected discharge date: TBD    Disposition:    [] Home       [] TCU       [] Rehab       [] Psych       [] SNF       [] Long Term Care Facility       [x] Other-    Chief Complaint: Back pain    Hospital Course: Initial HPI   Patient states she is very hungry and upset because no 
 [facility]  INPATIENT PHYSICAL THERAPY  DAILY NOTE  [facility] ORTHOPEDICS 7K - 7K-06/006-A      Discharge Recommendations: Home with Home Health PT and assistance from friend as needed   Equipment Recommendations: Yes (requesting grab bars for shower)               Time In: 739  Time Out: 813  Timed Code Treatment Minutes: 34 Minutes  Minutes: 34          Date: 2024  Patient Name: Natty Suarez,  Gender:  female        MRN: [id number]  : 1940  (84 y.o.)     Referring Practitioner: Gosia Payan PA-C  Diagnosis: Closed compression fracture of body of L1 vertebra (HCC)  Additional Pertinent Hx: Per EMR \"Natty Suarez is a 84 y.o. female with PMHx of CHF, COPD, CAD who presented to [facility] with chief complaint of low back pain following a fall. Patient arrived as a direct admission from [facility]. Patient reports that she was having trouble sleeping so she wanted to start decorating for Yenny. States that her decorations were in her closet and she was trying to pull them down but couldn't reach them. States that she had her exercise bike next to her closet so she stood on the pedal to try to reach the box and the pedal went out from under her causing her to fall on her bottom. Denies hitting her head or LOC. Reports mid back pain rated 7-8/10 and described as achy and burning. Denies saddle paraesthesias, radiculopathy, incontinence, or loss of function. Denies any other symptoms. Denies headache, dizziness, or lightheadedness. Denies fever or chills. Denies any recent illness. Denies chest pain or SOB. Denies abdominal pain or nausea. Denies dysuria or hematuria. \" Compression fracture of L1 and L3, indeterminate age noted on imaging.     Prior Level of Function:  Lives With: Alone  Type of Home: Apartment  Home Layout: One level  Home Access: Level entry  Home Equipment: Walker - Rolling, Cane   Bathroom Shower/Tub: Walk-in shower, Shower chair with back  Bathroom Toilet: Handicap 
 [facility]  INPATIENT PHYSICAL THERAPY  DAILY NOTE  [facility] ORTHOPEDICS 7K - 7K-06/006-A      Discharge Recommendations: Continue to assess pending progress and Patient would benefit from continued PT at discharge, limited session as transport present to take pt early to IR.   Equipment Recommendations: Yes (requesting grab bars for shower)             Time In: 909  Time Out: 917  Timed Code Treatment Minutes: 8 Minutes  Minutes: 8          Date: 2024  Patient Name: Natty Suarez,  Gender:  female        MRN: [id number]  : 1940  (84 y.o.)     Referring Practitioner: Gosia Payan PA-C  Diagnosis: Closed compression fracture of body of L1 vertebra (HCC)  Additional Pertinent Hx: Per EMR \"Natty Suarez is a 84 y.o. female with PMHx of CHF, COPD, CAD who presented to [facility] with chief complaint of low back pain following a fall. Patient arrived as a direct admission from [facility]. Patient reports that she was having trouble sleeping so she wanted to start decorating for Yenny. States that her decorations were in her closet and she was trying to pull them down but couldn't reach them. States that she had her exercise bike next to her closet so she stood on the pedal to try to reach the box and the pedal went out from under her causing her to fall on her bottom. Denies hitting her head or LOC. Reports mid back pain rated 7-8/10 and described as achy and burning. Denies saddle paraesthesias, radiculopathy, incontinence, or loss of function. Denies any other symptoms. Denies headache, dizziness, or lightheadedness. Denies fever or chills. Denies any recent illness. Denies chest pain or SOB. Denies abdominal pain or nausea. Denies dysuria or hematuria. \" Compression fracture of L1 and L3, indeterminate age noted on imaging.     Prior Level of Function:  Lives With: Alone  Type of Home: Apartment  Home Layout: One level  Home Access: Level entry  Home Equipment: Walker - Rolling, Cane 
0929 Pt arrived to special procedure room 2 for vertebroplasty of T12 and L3 under conscious sedation. No family present.   0930 Procedure explained using teach back method. Pt states understanding.  0934 Dr Henriquez into assess patient and explain procedure. This procedure has been fully reviewed with the patient and written informed consent has been obtained.   0942 Patient assisted to table in prone position. Monitor attached to patient. Comfort ensured.  0948 Pre-procedure images obtained.  0953 Procedure begins.  1009 Procedure complete. Post-procedure images obtained.  1011 Monitor removed. Patient assisted to bed in semi fowlers position. Comfort ensured.  1012 Reminded Estrella SMITH that prophylactic antibiotic needs to be given, it was ordered pre procedure and is active in the MAR. Primary nurse stated understanding.  1017 Patient transported to  in stable condition.   
Attempted to call registration. No answer.   
[facility]   PROGRESS NOTE      Patient: Natty Suarez  Room #: 7K-06/006-A            YOB: 1940  Age: 84 y.o.  Gender: female            Admit Date & Time: 12/11/2024  9:18 PM    Assessment:    The patient was attempted to be visited and was in care with physical therapy. The  attempted a visit after the patient was completed with therapy for the day. The patient declined a visit today showing signs of being tired.    Interventions:  The patient was provided information about Spiritual Care being available.     Outcomes:  The  wished the patient a positive day.     Plan:  1.Spiritual care will continue to follow the patient according to [facility] spiritual care SOP.       Electronically signed by Chaplain Jorge, on 12/13/2024 at 2:57 PM.  Spiritual Care Department  [facility]  [phone]    
Brief Intervention and Referral to Treatment Summary    Patient was provided PHQ-9, AUDIT-C and DAST Screening:      PHQ-9 Score: 1  AUDIT-C Score:  0  DAST Score:  0    Patient’s substance use is considered     Low Risk/Healthy      Patient’s depression is considered:     Minimal    Brief Education Was Provided    Patient was not receptive      Brief Intervention Is Provided (Only for AUDIT-C or DAST)     Patient reports readiness to decrease and/or stop use and a plan was discussed   Patient denies readiness to decrease and/or stop use and a plan was not discussed      Injured Trauma Survivor Screening  1.  When you were injured or right afterward   Did you think you were going to die? RESPONSES; YES+1/NO: NO  Do you think this was done to you intentionally? NO    Since your injury  Have you felt more restless, tense or jumpy than usual? RESPONSES; YES+1/NO: NO  Have you found yourself unable to stop worrying? RESPONSES; YES+1/NO: NO  Do you find yourself thinking that the world is unsafe and that people are not to be trusted? RESPONSES; YES+1/NO: NO    TOTAL SCORE from ITSS Questions 1 and 2: 0  NOTE: A score of greater than or equal to 2 is considered positive for PTSD risk and is to receive a community resource packet to link with appropriate providers.    Recommendations/Referrals for Brief and/or Specialized Treatment Provided to Patient:      
Department of Orthopedic Surgery  Spine Service    Progress Note        Subjective:    12/13/2024  Natty was seen laying in bed with no complaints. She was evaluated with IR for augmentation and which she is undergoing T12 and L3. ASA on hold with plan for Monday IR procedure     12/16/24  Natty was seen this AM. Resting in bed. Awaiting IR procedure. Doing overall well. Requesting sedation for procedure. She will discuss this with IR.     Vitals  VITALS:  /61   Pulse 67   Temp 98.1 °F (36.7 °C) (Oral)   Resp 18   Ht 1.6 m (5' 3\")   Wt 70.4 kg (155 lb 3.3 oz)   SpO2 99%   BMI 27.49 kg/m²   24HR INTAKE/OUTPUT:    Intake/Output Summary (Last 24 hours) at 12/16/2024 0707  Last data filed at 12/16/2024 0327  Gross per 24 hour   Intake 1050 ml   Output --   Net 1050 ml     URINARY CATHETER OUTPUT (Galindo):     DRAIN/TUBE OUTPUT:         PHYSICAL EXAM:    Orientation:  alert and oriented to person, place and time    Incision:  dressing in place, clean, dry, intact        Lower Extremity Motor :  quadriceps, extensor hallucis longus, dorsiflexion, plantarflexion 5/5 bilaterally  Lower Extremity Sensory:  Intact L1-S1    Flatus:  negative    ABNORMAL EXAM FINDINGS:  none    PROCEDURE: MRI LUMBAR SPINE WO CONTRAST     CLINICAL INFORMATION: L1 and L3 compression fractures of indeterminate age.     COMPARISON: CT scan of the lumbar spine dated 12/11/2024.     TECHNIQUE: Sagittal and axial T1 and T2-weighted images were obtained through  the lumbar spine.     FINDINGS:        There is a transitional vertebral body at the lumbosacral junction. For the  purposes of this report there is a partially lumbarized S1 vertebral body..   There areas of abnormal signal intensity in the L3 vertebral body consistent  with an acute fracture with 20% compression but is no retropulsion into the  spinal canal. There is abnormal signal intensity along the inferior aspect of  T12 consistent with an acute fracture with 15% 
Department of Orthopedic Surgery  Spine Service    Progress Note        Subjective:    12/13/2024  Natty was seen laying in bed with no complaints. She was evaluated with IR for augmentation and which she is undergoing T12 and L3. ASA on hold with plan for Monday IR procedure     Vitals  VITALS:  BP (!) 121/53   Pulse 67   Temp 98.4 °F (36.9 °C) (Oral)   Resp 16   Ht 1.6 m (5' 3\")   Wt 67 kg (147 lb 11.3 oz)   SpO2 92%   BMI 26.17 kg/m²   24HR INTAKE/OUTPUT:    Intake/Output Summary (Last 24 hours) at 12/13/2024 0934  Last data filed at 12/13/2024 0910  Gross per 24 hour   Intake 1140 ml   Output --   Net 1140 ml     URINARY CATHETER OUTPUT (Galindo):     DRAIN/TUBE OUTPUT:         PHYSICAL EXAM:    Orientation:  alert and oriented to person, place and time    Incision:  dressing in place, clean, dry, intact        Lower Extremity Motor :  quadriceps, extensor hallucis longus, dorsiflexion, plantarflexion 5/5 bilaterally  Lower Extremity Sensory:  Intact L1-S1    Flatus:  negative    ABNORMAL EXAM FINDINGS:  none    PROCEDURE: MRI LUMBAR SPINE WO CONTRAST     CLINICAL INFORMATION: L1 and L3 compression fractures of indeterminate age.     COMPARISON: CT scan of the lumbar spine dated 12/11/2024.     TECHNIQUE: Sagittal and axial T1 and T2-weighted images were obtained through  the lumbar spine.     FINDINGS:        There is a transitional vertebral body at the lumbosacral junction. For the  purposes of this report there is a partially lumbarized S1 vertebral body..   There areas of abnormal signal intensity in the L3 vertebral body consistent  with an acute fracture with 20% compression but is no retropulsion into the  spinal canal. There is abnormal signal intensity along the inferior aspect of  T12 consistent with an acute fracture with 15% compression..  There is no  retropulsion into the spinal canal..  There  is a Schmorl's node along the  superior endplate of T11.  No pars defects are noted.       The 
Discharge paperwork reviewed with patient. Questions were addressed. Follow up appointments made for patient. Tech wheeled patient down to discharge lobby and transported home by friend. Patient belongings sent with patient  
Discussed the case with orthopedic surgery.  Patient remains afebrile with a stable vital signs.  Patient is able to participate PT OT and walk around the reyes.  Per orthopedic surgery recommendations, patient being discharged home.  Please refer to discharge note on same day.  
Patient asked RN for an update on plan of care. This RN gave patient full update on plan per notes from hospitalist and ortho spine. Patient requested to speak with provider. RN perfect served BREE Chester. Gosia called and spoke with patient over the phone. Patient got very upset and verbally aggressive with Gosia over the phone. Patient requesting to speak with ortho spine PA. RN notified Mell Vergara via Perfect Serve. Patient is now more cooperative and agrees to speak with Mell in the morning when she rounds. Patient resting in the chair. All questions answered at this time. Will continue to monitor.   
[facility]  OCCUPATIONAL THERAPY MISSED TREATMENT NOTE  [facility] ORTHOPEDICS 7K  7K-06/006-A      Date: 2024  Patient Name: Natty Suarez        CSN: [id number]   : 1940  (84 y.o.)  Gender: female   Referring Practitioner: Gosia Payan PA-C            REASON FOR MISSED TREATMENT:  Pt recent returned to floor from IR for vertebroplasty of T12 and L3. Will check back tomorrow.                 
Transfer from MR Natty Suarez [birth year] mechanical fall, has 2 age indeterminate comp fx L1 at 10-20 % height loss and L3 at 50% height loss , co low back and hip pain neuro intact takes baby asa has had MS and Fentanyl, Fumich aware Dr Lilly adm     
Vert consult completed. Pt is a candidate for T12 and L3. Pt is on Aspirin (last dose 12/10/2024) Radiology protocol is a 5 day hold for this blood thinner and is confirmed by Dr Arroyo. Pt will be scheduled as an inpatient for the procedure on Monday morning at 1030. Please continue to hold blood thinners. Pt will need to be NPO for 4 hours prior to the procedure.     If pt is discharged over the weekend, the procedure date and time will be canceled and will need to be scheduled for a different date and time as an outpatient.   
  Prior Level of Assist for ADLs: Independent  Prior Level of Assist for Homemaking: Independent  Homemaking Responsibilities: Yes  Ambulation Assistance: Independent  Prior Level of Assist for Transfers: Independent  Prior Level of Assist for Ambulation: Independent household ambulator, with or without device  Has the patient had two or more falls in the past year or any fall with injury in the past year?: No    Active : Yes     Additional Comments: Pt was IND without use of device prior to this incident. Pt's POA assists with laundry.    VISION:WNL    HEARING:  WFL    COGNITION: WFL    RANGE OF MOTION:  Bilateral Upper Extremity:  WNL    STRENGTH:  Bilateral Upper Extremity:  Not Tested d/t back px    Hand Dominance: Right    SENSATION:   WFL    ADL:   Feeding: Independent.  Pt demos self feeding of breakfast tray while sitting in chair.   Grooming: Supervision.  Pt demos grooming tasks of washing her hands and combing her hair while standing sink side with Sup. Pt demos ability to complete tasks with BUE release.   Upper Extremity Dressing: Maximum Assistance.  To don TLSO  Toileting: Contact Guard Assistance.     Toilet Transfer: Contact Guard Assistance.   .    IADL:   Not Tested    BALANCE:  Sitting Balance:  Independent.    Standing Balance: Supervision, Contact Guard Assistance.      BED MOBILITY:  Rolling to Right: Contact Guard Assistance Pt participates in education on log rolling technique. Pt able to complete with CGA for tactile cues for technique.   Supine to Sit: Contact Guard Assistance      TRANSFERS:  Sit to Stand:  Contact Guard Assistance.    Stand to Sit: Contact Guard Assistance.      FUNCTIONAL MOBILITY:  Assistive Device: Rolling Walker  Assist Level:  Contact Guard Assistance.   Distance: To and from bathroom       AM-[facility] Inpatient Daily Activity Raw Score: 20  AM-PAC Inpatient ADL T-Scale Score : 42.03  ADL Inpatient CMS 0-100% Score: 38.32    Activity Tolerance:  Patient tolerance 
or more falls in the past year or any fall with injury in the past year?: No    Active : Yes     Additional Comments: Pt was IND without use of device prior to this incident. Pt's POA assists with laundry. Pts plans to stay with friends to help with needs, 1 story level entry home. friend home 24/7. Wants grab bars placed in shower    SUBJECTIVE: Patient laying in bed upon arrival; agreeable to therapy this date.  Patient pleasant and cooperative throughout session.  Patient family present, questions home equipment recommendations, verbalizing understanding; see additional activities for further details    PAIN: 0/10:      Vitals: Vitals not assessed per clinical judgement, see nursing flowsheet    COGNITION: WFL    ADL:   Grooming: Supervision.  Verbal/visual cues for RW placement and body positioning in order to stand at sink to wash hands  Upper Extremity Dressing: Supervision.  Verbal cues for velcro placement, donned lumbar corset seated EOB with increased time  Footwear Management: Supervision.  Kvng B shoes and tie  Toileting: Supervision.     Toilet Transfer: Supervision.   .    BALANCE:  Sitting Balance:  Modified Independent.    Standing Balance: Supervision. RW, no LOB    BED MOBILITY:  Supine to Sit: Supervision, X 1, with head of bed raised, with rail, with verbal cues , with increased time for completion      TRANSFERS:  Sit to Stand:  Modified Independent.    Stand to Sit: Supervision. Verbal cues for hand placement    FUNCTIONAL MOBILITY:  Assistive Device: Rolling Walker  Assist Level:  Supervision.   Distance: To and from bathroom and Household distances within unit  Steady pace, no LOB     ADDITIONAL ACTIVITIES:  Patient/family educated regarding home set up and safety with lose rugs, wide walk ways as well as grab bars and shower chair to increase independence and safety within the home, verbalizing understanding and all questions addressed.    ASSESSMENT:     Activity Tolerance:  Patient 
vertebral body consistent  with an acute fracture with 20% compression but is no retropulsion into the  spinal canal. There is abnormal signal intensity along the inferior aspect of  T12 consistent with an acute fracture with 15% compression..  There is no  retropulsion into the spinal canal..  There  is a Schmorl's node along the  superior endplate of T11.  No pars defects are noted.       The distal spinal cord, conus medullaris and cauda equina are normal.      On the axial images, at T11-12 and T12-L1, there is no disc herniation, canal or  foraminal stenosis.     At L1-L2, there is no disc herniation, canal or foraminal stenosis.     At L2-L3, there is no disc herniation, canal or foraminal stenosis.     At L3-L4, there is moderate canal and bilateral foraminal stenosis.     At L4-L5, there is mild to moderate canal and moderate bilateral foraminal  stenosis.     At L5-S1, there is mild to moderate canal and moderate bilateral foraminal  stenosis.     There is degenerative change involving the sacroiliac joints bilaterally..        IMPRESSION:  1. Transitional vertebral body at the lumbosacral junction described as a  partially lumbarized S1 vertebral body for the purposes of this report.  2. Abnormal signal intensity in the L3 vertebral body consistent with an acute  fracture with 20% compression. No retropulsion into the spinal canal.  3. Abnormal signal intensity along the inferior aspect of T12 consistent with an  acute fracture with 15% compression. No retropulsion into the spinal canal.  4. There is moderate canal bilateral foraminal stenosis at L3-4.  5. There is mild to moderate canal and moderate bilateral foraminal stenosis at  L4-5 and L5-S1.  6. There is degenerative change involving the sacroiliac joints bilaterally.        Electronically signed by Dr. Tylor Valle    LABS:    HgB:    Lab Results   Component Value Date/Time    HGB 13.5 12/16/2024 06:17 AM     CBC with Differential:    Lab Results 
  4. There is moderate canal bilateral foraminal stenosis at L3-4.   5. There is mild to moderate canal and moderate bilateral foraminal stenosis at   L4-5 and L5-S1.   6. There is degenerative change involving the sacroiliac joints bilaterally.               **This report has been created using voice recognition software. It may contain   minor errors which are inherent in voice recognition technology.**            Electronically signed by Dr. Tylor Valle      CT COMPARISON OF OUTSIDE FILMS   Final Result      XR COMPARISON OF OUTSIDE FILMS   Final Result      IR VERTEBROPLASTY LUMBOSACRAL    (Results Pending)       DVT prophylaxis: [] Lovenox                                 [x] SCDs                                 [] SQ Heparin                                 [] Encourage ambulation           [] Already on Anticoagulation     Code Status: Full Code    PT/OT Eval Status: y    Tele:   [] yes             [] no    Electronically signed by Shelley Jean DO on 12/15/2024 at 9:58 AM   
position with all fall risk precautions in place. Pt in bed following session, all needs and call light in reach, alarm on.
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
## 2025-01-13 DIAGNOSIS — E03.9 HYPOTHYROIDISM, UNSPECIFIED TYPE: ICD-10-CM

## 2025-01-13 RX ORDER — LEVOTHYROXINE SODIUM 50 UG/1
50 TABLET ORAL DAILY
Qty: 90 TABLET | Refills: 3 | OUTPATIENT
Start: 2025-01-13

## 2025-02-17 ENCOUNTER — PATIENT OUTREACH (OUTPATIENT)
Dept: PRIMARY CARE | Facility: CLINIC | Age: 85
End: 2025-02-17
Payer: MEDICARE

## 2025-02-17 ENCOUNTER — TELEPHONE (OUTPATIENT)
Dept: PRIMARY CARE | Facility: CLINIC | Age: 85
End: 2025-02-17
Payer: MEDICARE

## 2025-02-17 NOTE — PROGRESS NOTES
Discharge Facility: [facility]  Discharge Diagnosis: COPD exacerbation, Influenza  Admission Date: 11 Feb 25  Discharge Date: 16 Feb 25    PCP Appointment Date: Tasked to office  Specialist Appointment Date: 11 June 25 (Judith @ [facility])  Hospital Encounter and Summary Linked: Yes    No contact on discharge outreach due to patient number not working. Tasked to office for scheduling. Will attempt outreach again in 2 wks.

## 2025-02-17 NOTE — TELEPHONE ENCOUNTER
Ok to attempt TCM but not sure that she is still following with our office and not a working number.

## 2025-02-17 NOTE — TELEPHONE ENCOUNTER
----- Message from Rodrigo Brennan sent at 2/17/2025 10:03 AM EST -----  Regarding: No contact on discharge outreach. pt's number no good. maybe you have another way to get a hold of her  Discharge facility: [facility]  Discharge diagnosis: COPD exacerbation, Influenza     Date of discharge: 16 Feb 25        Unsuccessful attempts x2 to reach patient for PCP Follow-up  Please have office staff reach out to patient and schedule an appointment within 14 days from discharge date.  Pt's phone number not working. Not sure if you guys have another way to get a hold of patient, or if she is even a patient in your office anymore

## 2025-03-03 ENCOUNTER — PATIENT OUTREACH (OUTPATIENT)
Dept: PRIMARY CARE | Facility: CLINIC | Age: 85
End: 2025-03-03
Payer: MEDICARE

## 2025-03-03 NOTE — PROGRESS NOTES
No contact on 2 wk call back attempt. Unable to leave message due to number in chart being no good. No follow up made with PCP within 14 day window.

## 2025-03-18 ENCOUNTER — DOCUMENTATION (OUTPATIENT)
Dept: PRIMARY CARE | Facility: CLINIC | Age: 85
End: 2025-03-18
Payer: MEDICARE

## 2025-06-09 ENCOUNTER — HOSPITAL ENCOUNTER (OUTPATIENT)
Dept: CT IMAGING | Age: 85
Discharge: HOME | End: 2025-06-09
Attending: RADIOLOGY

## 2025-06-09 ENCOUNTER — HOSPITAL ENCOUNTER (OUTPATIENT)
Dept: GENERAL RADIOLOGY | Age: 85
Discharge: HOME | End: 2025-06-09
Attending: RADIOLOGY

## 2025-06-09 ENCOUNTER — TELEPHONE (OUTPATIENT)
Dept: PULMONOLOGY | Age: 85
End: 2025-06-09

## 2025-06-09 DIAGNOSIS — Z00.6 EXAMINATION FOR NORMAL COMPARISON FOR CLINICAL RESEARCH: ICD-10-CM

## 2025-06-10 NOTE — PROGRESS NOTES
[facility]    Patient: KIMBERLEE DUDLEY, 84 y.o.   : 1940  2025       Assessment/Plan     Assessment & Plan  1. Abnormal PET: The patient has a 2.1 x 1.5 cm left lower lobe nodule with an SUV of 7.5 and mediastinal lymphadenopathy. She also has a left adrenal gland nodule 1.5 x 1 cm with an SUV of 3.2.  - Discussed with patient possible etiologies of her PET scan Findings including but not limited to malignant vs infectious vs inflammatory. Discussed biopsy options with patient including IR biopsy of adrenal gland nodule vs Navigational bronch with EBUS with Dr. Gomez. [facility] IR is reviewing case for biopsy of adrenal nodule. Dr. Gomez interventional pulmonologist at [facility] advised MRI may need to be completed prior to considering a biopsy of the adrenal nodule. Discussed the above with the patient. Advised patient if she completes EBUS with navigational bronch she may also require a biopsy of the adrenal gland nodule. She verbalized understanding and wishes to proceed with navigational bronch and EBUS with Dr. Gomez. Advised this will be scheduled for 25 at 9AM at [facility] with Dr. Mandeep Gomez  - Discussed potential risks including but not limited to bleeding, infection, and lung collapse. Patient verbalized understanding   - If biopsy results indicate cancer, arrange consultation with an oncologist.    2. Shortness of breath with cigarette use  - Order pulmonary function test to evaluate for COPD  - Advise rehoming bird due to risk of lung infection from bird droppings.    3. Pneumonia: The patient had pneumonia in May 2025, which was associated with bilateral pleural effusions and hemoptysis.    Follow-up  - Follow up in 1 week post-biopsy to review the results.     Reviewed preventative vaccinations    Advised patient to call office with any changes, questions, or concerns regarding respiratory status or issues with prescribed

## 2025-06-11 ENCOUNTER — TELEPHONE (OUTPATIENT)
Dept: PULMONOLOGY | Age: 85
End: 2025-06-11

## 2025-06-11 ENCOUNTER — OFFICE VISIT (OUTPATIENT)
Dept: PULMONOLOGY | Age: 85
End: 2025-06-11

## 2025-06-11 VITALS
TEMPERATURE: 98 F | DIASTOLIC BLOOD PRESSURE: 64 MMHG | BODY MASS INDEX: 23.67 KG/M2 | SYSTOLIC BLOOD PRESSURE: 114 MMHG | HEIGHT: 63 IN | WEIGHT: 133.6 LBS | OXYGEN SATURATION: 97 % | HEART RATE: 71 BPM

## 2025-06-11 DIAGNOSIS — Z87.01 HISTORY OF PNEUMONIA: ICD-10-CM

## 2025-06-11 DIAGNOSIS — F17.210 CIGARETTE NICOTINE DEPENDENCE WITHOUT COMPLICATION: Primary | ICD-10-CM

## 2025-06-11 DIAGNOSIS — E27.9 ADRENAL NODULE: ICD-10-CM

## 2025-06-11 DIAGNOSIS — R06.09 DYSPNEA ON EXERTION: ICD-10-CM

## 2025-06-11 DIAGNOSIS — R91.1 LEFT LOWER LOBE PULMONARY NODULE: ICD-10-CM

## 2025-06-11 DIAGNOSIS — R94.2 ABNORMAL PET OF LEFT LUNG: ICD-10-CM

## 2025-06-11 RX ORDER — FLUTICASONE PROPIONATE 50 MCG
1 SPRAY, SUSPENSION (ML) NASAL DAILY PRN
COMMUNITY

## 2025-06-11 RX ORDER — TRAMADOL HYDROCHLORIDE 50 MG/1
50 TABLET ORAL EVERY 6 HOURS PRN
COMMUNITY

## 2025-06-11 RX ORDER — NITROGLYCERIN 80 MG/1
1 PATCH TRANSDERMAL PRN
COMMUNITY

## 2025-06-11 NOTE — TELEPHONE ENCOUNTER
This was the plan for Dr. Gomez for this patient you are seeing her today at 11am.     Thank you!

## 2025-06-11 NOTE — TELEPHONE ENCOUNTER
----- Message from Dr. Mandeep Gomez DO sent at 6/10/2025  4:45 PM EDT -----  Regarding: RE: Lung Nodule Patient  I reviewed the patient's imaging and am able to proceed with robotic bronchoscopy and biopsy along with EBUS and biopsy of the lymph nodes.  I plan on trying to get this scheduled on Monday June 16, unsure of the time as of yet, but I will reach out to the patient to schedule once I know the available times and will update you if the date needs to change.    Thanks,  Mandeep  ----- Message -----  From: Thelma De Leon CMA (AAMA)  Sent: 6/9/2025   3:30 PM EDT  To: Hanna Leung LPN; Mandeep Gomez DO  Subject: Lung Nodule Patient                              This patient has an appointment on Wednesday June 11th 2025 with Oralia for a LLL nodule. Please take a look at the chart and let me know if this is a procedure you can do. Please send a message back to myself and Hanna. Thank you.

## 2025-06-11 NOTE — TELEPHONE ENCOUNTER
Robotic Bronch scheduled on 6/16/25 at 9am. Order for Robotic bronch faxed to [facility] surgery.

## 2025-06-16 ENCOUNTER — APPOINTMENT (OUTPATIENT)
Dept: ENDOSCOPY | Age: 85
End: 2025-06-16
Attending: INTERNAL MEDICINE
Payer: MEDICARE

## 2025-06-16 ENCOUNTER — ANESTHESIA EVENT (OUTPATIENT)
Dept: ENDOSCOPY | Age: 85
End: 2025-06-16
Payer: MEDICARE

## 2025-06-16 ENCOUNTER — APPOINTMENT (OUTPATIENT)
Dept: GENERAL RADIOLOGY | Age: 85
End: 2025-06-16
Attending: INTERNAL MEDICINE
Payer: MEDICARE

## 2025-06-16 ENCOUNTER — ANESTHESIA (OUTPATIENT)
Dept: ENDOSCOPY | Age: 85
End: 2025-06-16
Payer: MEDICARE

## 2025-06-16 ENCOUNTER — HOSPITAL ENCOUNTER (OUTPATIENT)
Age: 85
Setting detail: OUTPATIENT SURGERY
Discharge: HOME | End: 2025-06-16
Attending: INTERNAL MEDICINE | Admitting: INTERNAL MEDICINE
Payer: MEDICARE

## 2025-06-16 VITALS
HEIGHT: 63 IN | WEIGHT: 135 LBS | RESPIRATION RATE: 16 BRPM | OXYGEN SATURATION: 92 % | SYSTOLIC BLOOD PRESSURE: 138 MMHG | DIASTOLIC BLOOD PRESSURE: 51 MMHG | TEMPERATURE: 97.1 F | BODY MASS INDEX: 23.92 KG/M2 | HEART RATE: 85 BPM

## 2025-06-16 PROCEDURE — 7100000010 HC PHASE II RECOVERY - FIRST 15 MIN: Performed by: INTERNAL MEDICINE

## 2025-06-16 PROCEDURE — 3700000000 HC ANESTHESIA ATTENDED CARE: Performed by: INTERNAL MEDICINE

## 2025-06-16 PROCEDURE — 7100000001 HC PACU RECOVERY - ADDTL 15 MIN: Performed by: INTERNAL MEDICINE

## 2025-06-16 PROCEDURE — 88305 TISSUE EXAM BY PATHOLOGIST: CPT

## 2025-06-16 PROCEDURE — C1713 ANCHOR/SCREW BN/BN,TIS/BN: HCPCS | Performed by: INTERNAL MEDICINE

## 2025-06-16 PROCEDURE — 3609020000 HC BRONCHOSCOPY W/EBUS FNA 3/> NODE: Performed by: INTERNAL MEDICINE

## 2025-06-16 PROCEDURE — 88112 CYTOPATH CELL ENHANCE TECH: CPT

## 2025-06-16 PROCEDURE — 7100000011 HC PHASE II RECOVERY - ADDTL 15 MIN: Performed by: INTERNAL MEDICINE

## 2025-06-16 PROCEDURE — 2709999900 HC NON-CHARGEABLE SUPPLY: Performed by: INTERNAL MEDICINE

## 2025-06-16 PROCEDURE — 7100000000 HC PACU RECOVERY - FIRST 15 MIN: Performed by: INTERNAL MEDICINE

## 2025-06-16 PROCEDURE — 2720000010 HC SURG SUPPLY STERILE: Performed by: INTERNAL MEDICINE

## 2025-06-16 PROCEDURE — 3700000001 HC ADD 15 MINUTES (ANESTHESIA): Performed by: INTERNAL MEDICINE

## 2025-06-16 PROCEDURE — 6360000002 HC RX W HCPCS: Performed by: NURSE ANESTHETIST, CERTIFIED REGISTERED

## 2025-06-16 PROCEDURE — 88173 CYTOPATH EVAL FNA REPORT: CPT

## 2025-06-16 PROCEDURE — 3603165200 HC BRNCHSC EBUS GUIDED SAMPL 1/2 NODE STATION/STRUX: Performed by: INTERNAL MEDICINE

## 2025-06-16 PROCEDURE — 2580000003 HC RX 258: Performed by: INTERNAL MEDICINE

## 2025-06-16 PROCEDURE — 2500000003 HC RX 250 WO HCPCS: Performed by: NURSE ANESTHETIST, CERTIFIED REGISTERED

## 2025-06-16 RX ORDER — SODIUM CHLORIDE 9 MG/ML
INJECTION, SOLUTION INTRAVENOUS CONTINUOUS
Status: DISCONTINUED | OUTPATIENT
Start: 2025-06-16 | End: 2025-06-16 | Stop reason: HOSPADM

## 2025-06-16 RX ORDER — PHENYLEPHRINE HCL IN 0.9% NACL 1 MG/10 ML
SYRINGE (ML) INTRAVENOUS
Status: DISCONTINUED | OUTPATIENT
Start: 2025-06-16 | End: 2025-06-16 | Stop reason: SDUPTHER

## 2025-06-16 RX ORDER — LIDOCAINE HYDROCHLORIDE 20 MG/ML
INJECTION, SOLUTION EPIDURAL; INFILTRATION; INTRACAUDAL; PERINEURAL
Status: DISCONTINUED | OUTPATIENT
Start: 2025-06-16 | End: 2025-06-16 | Stop reason: SDUPTHER

## 2025-06-16 RX ORDER — ROCURONIUM BROMIDE 10 MG/ML
INJECTION, SOLUTION INTRAVENOUS
Status: DISCONTINUED | OUTPATIENT
Start: 2025-06-16 | End: 2025-06-16 | Stop reason: SDUPTHER

## 2025-06-16 RX ORDER — ONDANSETRON 2 MG/ML
INJECTION INTRAMUSCULAR; INTRAVENOUS
Status: DISCONTINUED | OUTPATIENT
Start: 2025-06-16 | End: 2025-06-16 | Stop reason: SDUPTHER

## 2025-06-16 RX ORDER — DEXAMETHASONE SODIUM PHOSPHATE 4 MG/ML
INJECTION, SOLUTION INTRA-ARTICULAR; INTRALESIONAL; INTRAMUSCULAR; INTRAVENOUS; SOFT TISSUE
Status: DISCONTINUED | OUTPATIENT
Start: 2025-06-16 | End: 2025-06-16 | Stop reason: SDUPTHER

## 2025-06-16 RX ORDER — FENTANYL CITRATE 50 UG/ML
INJECTION, SOLUTION INTRAMUSCULAR; INTRAVENOUS
Status: DISCONTINUED | OUTPATIENT
Start: 2025-06-16 | End: 2025-06-16 | Stop reason: SDUPTHER

## 2025-06-16 RX ORDER — PROPOFOL 10 MG/ML
INJECTION, EMULSION INTRAVENOUS
Status: DISCONTINUED | OUTPATIENT
Start: 2025-06-16 | End: 2025-06-16 | Stop reason: SDUPTHER

## 2025-06-16 RX ADMIN — DEXAMETHASONE SODIUM PHOSPHATE 8 MG: 4 INJECTION, SOLUTION INTRAMUSCULAR; INTRAVENOUS at 09:52

## 2025-06-16 RX ADMIN — Medication 100 MCG: at 10:16

## 2025-06-16 RX ADMIN — Medication 100 MCG: at 10:25

## 2025-06-16 RX ADMIN — SODIUM CHLORIDE: 0.9 INJECTION, SOLUTION INTRAVENOUS at 08:47

## 2025-06-16 RX ADMIN — Medication 100 MCG: at 10:22

## 2025-06-16 RX ADMIN — LIDOCAINE HYDROCHLORIDE 50 MG: 20 INJECTION, SOLUTION EPIDURAL; INFILTRATION; INTRACAUDAL; PERINEURAL at 09:52

## 2025-06-16 RX ADMIN — SUGAMMADEX 200 MG: 100 INJECTION, SOLUTION INTRAVENOUS at 10:53

## 2025-06-16 RX ADMIN — ROCURONIUM BROMIDE 50 MG: 10 INJECTION, SOLUTION INTRAVENOUS at 09:52

## 2025-06-16 RX ADMIN — ONDANSETRON 4 MG: 2 INJECTION, SOLUTION INTRAMUSCULAR; INTRAVENOUS at 10:41

## 2025-06-16 RX ADMIN — Medication 100 MCG: at 10:19

## 2025-06-16 RX ADMIN — FENTANYL CITRATE 50 MCG: 50 INJECTION, SOLUTION INTRAMUSCULAR; INTRAVENOUS at 09:52

## 2025-06-16 RX ADMIN — PROPOFOL 120 MG: 10 INJECTION, EMULSION INTRAVENOUS at 09:52

## 2025-06-16 ASSESSMENT — LIFESTYLE VARIABLES: SMOKING_STATUS: 1

## 2025-06-16 ASSESSMENT — COPD QUESTIONNAIRES: CAT_SEVERITY: MODERATE

## 2025-06-16 ASSESSMENT — PAIN - FUNCTIONAL ASSESSMENT
PAIN_FUNCTIONAL_ASSESSMENT: 0-10

## 2025-06-16 ASSESSMENT — PAIN SCALES - GENERAL
PAINLEVEL_OUTOF10: 0

## 2025-06-16 NOTE — ANESTHESIA PRE PROCEDURE
Department of Anesthesiology  Preprocedure Note       Name:  Natty Suarez   Age:  84 y.o.  :  1940                                          MRN:  [id number]         Date:  2025      Surgeon: Surgeon(s):  Mandeep Gomez DO    Procedure: Procedure(s):  ROBOTIC BRONCHOSCOPY W/CONE BEAM CT EBUS    Medications prior to admission:   Prior to Admission medications    Medication Sig Start Date End Date Taking? Authorizing Provider   fluticasone (FLONASE) 50 MCG/ACT nasal spray 1 spray by Each Nostril route daily as needed for Rhinitis   Yes ProviderCarmen MD   nitroGLYCERIN (NITRO-DUR) 0.4 MG/HR Place 1 patch onto the skin as needed   Yes Carmen Parks MD   traMADol (ULTRAM) 50 MG tablet Take 1 tablet by mouth every 6 hours as needed for Pain.   Yes Carmen Parks MD   potassium chloride 20 MEQ/15ML (10%) oral solution Take 15 mLs by mouth daily . Please take it with hydrochlorothiazide (water pill) 24  Yes Shelley Jean DO   zinc gluconate 50 MG tablet Take 1 tablet by mouth daily   Yes Provider, MD Carmen   vitamin D (CHOLECALCIFEROL) 125 MCG (5000 UT) CAPS capsule Take 1 capsule by mouth daily   Yes ProviderCarmen MD   albuterol sulfate HFA (VENTOLIN HFA) 108 (90 Base) MCG/ACT inhaler Inhale 2 puffs into the lungs every 6 hours as needed for Wheezing   Yes ProviderCarmen MD   amLODIPine (NORVASC) 10 MG tablet Take 1 tablet by mouth daily   Yes ProviderCarmen MD   atorvastatin (LIPITOR) 40 MG tablet Take 1 tablet by mouth daily   Yes Carmen Parks MD   hydroCHLOROthiazide (HYDRODIURIL) 25 MG tablet Take 1 tablet by mouth daily   Yes Carmen Parks MD   isosorbide mononitrate (IMDUR) 30 MG extended release tablet Take 1 tablet by mouth daily   Yes Carmen Parks MD   levothyroxine (SYNTHROID) 50 MCG tablet Take 1 tablet by mouth Daily   Yes Carmen Parks MD   metoprolol succinate (TOPROL XL) 100 MG extended

## 2025-06-16 NOTE — PROGRESS NOTES
Robotic bronchoscopy and EBUS completed. Tolerated well. Photos taken. FNA, transbronchial biopsies, and mini BAL obtained. EBUS with FNA to stations 7, 10L, 10R, and 4R lymph nodes. Seven specimen jars labeled and sent to lab. Scopes , , and robotic probe used.

## 2025-06-16 NOTE — ANESTHESIA POSTPROCEDURE EVALUATION
Department of Anesthesiology  Postprocedure Note    Patient: Natty Suarez  MRN: [id number]  YOB: 1940  Date of evaluation: 6/16/2025    Procedure Summary       Date: 06/16/25 Room / Location: [place] [id number] / [facility]    Anesthesia Start: 0946 Anesthesia Stop: 1108    Procedures:       ROBOTIC BRONCHOSCOPY W/CONE BEAM CT EBUS      BRONCHOSCOPY ENDOBRONCHIAL ULTRASOUND FINE NEEDLE ASPIRATION Diagnosis:       Lung nodule      (Lung nodule [R91.1])    Surgeons: Mandeep Gomez DO Responsible Provider: Anirudh Sharif DO    Anesthesia Type: general ASA Status: 4            Anesthesia Type: No value filed.    Kat Phase I: Kat Score: 9    Kat Phase II: Kat Score: 10    Anesthesia Post Evaluation    Patient location during evaluation: PACU  Patient participation: complete - patient participated  Level of consciousness: awake and alert  Airway patency: patent  Nausea & Vomiting: no vomiting and no nausea  Cardiovascular status: hemodynamically stable  Respiratory status: acceptable  Hydration status: stable  Pain management: adequate        No notable events documented.

## 2025-06-16 NOTE — H&P
Update History & Physical    The patient's History and Physical of June 11, from Oralia Larson CNP  was reviewed with the patient and I examined the patient. There was no change. The surgical site was confirmed by the patient and me.       Plan: The risks, benefits, expected outcome, and alternative to the recommended procedure have been discussed with the patient. Patient understands, and accepts risks of severe bleeding/infeciton <1% and pneumothorax 1% and wants to proceed with the procedure.     Electronically signed by ELIZABETH VELIZ DO on 6/16/2025 at 9:35 AM

## 2025-06-16 NOTE — PROCEDURES
Bronchoscopy Procedure Note    Date of Operation: 6/16/2025    Pre-op Diagnosis: PET positive nodule and lymphadenopathy    Post-op Diagnosis: same    Surgeon: ELIZABETH VELIZ DO    Assistants: Margo Daniels CNP    Anesthesia: General endotracheal anesthesia    Operation: Flexible fiberoptic bronchoscopy, robotic bronchoscopy with biopsy, FNA and mini-bal with endobronchial ultrasound with biopsy    Findings: see below    Specimen: see below    Estimated Blood Loss: Minimal    Drains: N/A    Complications: none apparent    Indications and History:  The patient is a 84 y.o. female with nodules and lymphadenopathy.  The risks, benefits, complications, treatment options and expected outcomes were discussed with the patient.  The possibilities of reaction to medication, pulmonary aspiration, perforation of a viscus, bleeding, failure to diagnose a condition and creating a complication requiring transfusion or operation were discussed with the patient who freely signed the consent.      Description of Procedure:  The patient was seen in the Holding Room and the site of surgery properly noted/marked.  The patient was taken to endo 14, identified as Natty Suarez and the procedure verified as Flexible Fiberoptic Bronchoscopy.  A Time Out was held and the above information confirmed.     Details of Procedure: Time out was performed. Patient was positioned in appropriate position. After achieving adequate sedation the bronchoscope was placed through the ET tube and advanced to the tracheal kenan. Bronchoscope was then advanced into the right main stem bronchus and inspection of the right upper lobe, right middle lobe and right lower lobe was performed. The bronchoscope was then withdrawn to the tracheal kenan. The bronchoscope was then advanced into the left main stem bronchus and the left upper lobe, left lingular and left lower lobe segments were inspected. Using the Ion endoluminal robotic system, the bronchoscope

## 2025-06-16 NOTE — PROGRESS NOTES
Admitted to Endo department and admitted to Endo room 5  Plan of care reviewed with patient.   Call light within reach.   Allergies reviewed with pt   Bed in lowest position, locked, with one bed rail up.   Appropriate arm bands on patient.   Bathroom offered.   All questions and concerns of patient addressed    Name: Del  Relationship to patient: family  Phone number: [phone]

## 2025-06-16 NOTE — PROGRESS NOTES
Recovery mode, pt denies discomfort. Passing gas, taking in fluids. Dr. Gomez discussed findings with pt and responsible party. Discharge instructions provided and understanding verbalized.

## 2025-06-16 NOTE — PROGRESS NOTES
1101: Pt arrives to pacu. Eyes closed and respirations easy and unlabored. Pt on 2L NC. Pt awakens easy to voice. A&Ox3. Occasional, moist, strong, non productive cough present. Left arm in sling. Pt denies any pain/nausea. VSS.     1110: Pt resting in bed with eyes closed. Denies any pain/nausea. VSS.    1120: Pt incontinent of urine. Viviana care done, clean chux placed, and attends. Pt denies any nausea/pain. Pt resting in bed with awake. Respirations easy and unlabored. VSS.     1131: Pt meets criteria for pacu discharge.

## 2025-06-18 ENCOUNTER — TELEPHONE (OUTPATIENT)
Dept: PULMONOLOGY | Age: 85
End: 2025-06-18

## 2025-06-18 DIAGNOSIS — F17.210 CIGARETTE NICOTINE DEPENDENCE WITHOUT COMPLICATION: ICD-10-CM

## 2025-06-18 DIAGNOSIS — R94.2 ABNORMAL PET OF LEFT LUNG: ICD-10-CM

## 2025-06-18 DIAGNOSIS — C34.92 ADENOCARCINOMA OF LEFT LUNG (HCC): Primary | ICD-10-CM

## 2025-06-18 DIAGNOSIS — E27.9 ADRENAL NODULE: ICD-10-CM

## 2025-06-18 NOTE — TELEPHONE ENCOUNTER
Patient called in stating that she cannot come in today and she would like Oralia to call her with her results as she is worried. I told her that I would send a message to Oralia asking her to call patient to give her some information if she can. I told her if not I will give her a call back with what Oralia says. Patient verbalized understanding. Please advise.

## 2025-06-18 NOTE — TELEPHONE ENCOUNTER
Call patient per request. Confirmed with the patient she was okay with receiving biopsy results over the phone.  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  Advised that mini BAL in the left lower lobe was positive for malignancy.  Advised patient that station 10R, 4R, 7, 10L lymph nodes negative for malignant cells.  Advised patient that full pulmonary function test needs to be completed prior to determining if patient is surgical candidate.  Patient advised me that at this time she is not interested in surgery.  She wishes to discuss her options for treatment with oncology and radiation oncology.  Advised patient I placed referral to both oncology and radiation oncology for further evaluation.  Patient was advised they will call her to schedule appointments.  Advised patient to keep her follow-up appointment next Tuesday to discuss results further in detail in person.  Patient agreeable and does wish to keep her in person face-to-face appointment.  [facility] called patient yesterday and advised that interventional radiology at [facility] looked at her case and did not feel they could biopsy adrenal gland of concern on PET scan due to needing to go through the kidney.  Per documentation, they recommended patient to have repeat scan in 3 months to evaluate the adrenal lesion at that time.    Patient was feeling okay after conversation and accepting of her new diagnosis.  Patient thanked me for my call.

## 2025-06-23 NOTE — PROGRESS NOTES
be discontinued once a person has not smoked for 15 years or develops a health problem that substantially limits life expectancy or the ability or willingness to have curative lung surgery.     Bronchoscopy Procedure Note     Date of Operation: 6/16/2025     Pre-op Diagnosis: PET positive nodule and lymphadenopathy     Post-op Diagnosis: same     Surgeon: ELIZABETH VELIZ DO     Assistants: Margo Daniels CNP     Anesthesia: General endotracheal anesthesia     Operation: Flexible fiberoptic bronchoscopy, robotic bronchoscopy with biopsy, FNA and mini-bal with endobronchial ultrasound with biopsy     Findings: see below     Specimen: see below     Estimated Blood Loss: Minimal     Drains: N/A     Complications: none apparent     Indications and History:  The patient is a 84 y.o. female with nodules and lymphadenopathy.  The risks, benefits, complications, treatment options and expected outcomes were discussed with the patient.  The possibilities of reaction to medication, pulmonary aspiration, perforation of a viscus, bleeding, failure to diagnose a condition and creating a complication requiring transfusion or operation were discussed with the patient who freely signed the consent.       Description of Procedure:  The patient was seen in the Holding Room and the site of surgery properly noted/marked.  The patient was taken to endo 14, identified as Natty Suarez and the procedure verified as Flexible Fiberoptic Bronchoscopy.  A Time Out was held and the above information confirmed.      Details of Procedure: Time out was performed. Patient was positioned in appropriate position. After achieving adequate sedation the bronchoscope was placed through the ET tube and advanced to the tracheal kenan. Bronchoscope was then advanced into the right main stem bronchus and inspection of the right upper lobe, right middle lobe and right lower lobe was performed. The bronchoscope was then withdrawn to the tracheal kenan.

## 2025-06-24 ENCOUNTER — OFFICE VISIT (OUTPATIENT)
Dept: PULMONOLOGY | Age: 85
End: 2025-06-24
Payer: MEDICARE

## 2025-06-24 VITALS
OXYGEN SATURATION: 94 % | WEIGHT: 132.4 LBS | DIASTOLIC BLOOD PRESSURE: 68 MMHG | HEIGHT: 63 IN | SYSTOLIC BLOOD PRESSURE: 124 MMHG | BODY MASS INDEX: 23.46 KG/M2 | HEART RATE: 91 BPM | TEMPERATURE: 97.7 F

## 2025-06-24 DIAGNOSIS — C34.92 ADENOCARCINOMA OF LEFT LUNG (HCC): Primary | ICD-10-CM

## 2025-06-24 DIAGNOSIS — F17.210 CIGARETTE NICOTINE DEPENDENCE WITHOUT COMPLICATION: ICD-10-CM

## 2025-06-24 PROCEDURE — 1160F RVW MEDS BY RX/DR IN RCRD: CPT

## 2025-06-24 PROCEDURE — 1123F ACP DISCUSS/DSCN MKR DOCD: CPT

## 2025-06-24 PROCEDURE — G8427 DOCREV CUR MEDS BY ELIG CLIN: HCPCS

## 2025-06-24 PROCEDURE — G8420 CALC BMI NORM PARAMETERS: HCPCS

## 2025-06-24 PROCEDURE — 4004F PT TOBACCO SCREEN RCVD TLK: CPT

## 2025-06-24 PROCEDURE — 1159F MED LIST DOCD IN RCRD: CPT

## 2025-06-24 PROCEDURE — 99214 OFFICE O/P EST MOD 30 MIN: CPT

## 2025-06-24 PROCEDURE — 1090F PRES/ABSN URINE INCON ASSESS: CPT

## 2025-06-24 PROCEDURE — G8400 PT W/DXA NO RESULTS DOC: HCPCS

## 2025-06-25 ENCOUNTER — TELEPHONE (OUTPATIENT)
Dept: PULMONOLOGY | Age: 85
End: 2025-06-25

## 2025-06-25 NOTE — TELEPHONE ENCOUNTER
Patient called the office stating that she specifically requested that Oralia put what they talked about in her appointment yesterday regarding her cancer (what type, location etc.) in her after visit summary, but nothing is in there about her cancer. I told patient that I see that the information regarding her cancer is in the office note from yesterday but I am not sure how much of that translates to the after visit summary. Patient became extremely angry and raising her voice that she told Oralia to put that in her papers and nothing is here. I explained that I could help her set up a MyChart for her to be able to see the office note on there or she soul pick it up from our office. Patient raised her voice again stating that she has been in our office the past two days and will not be coming back to [place] for this. I told her that the only other option is that I mail the office note to her. Patient told me that do that. Note was mailed and call was ended.

## 2025-06-26 PROBLEM — C34.32 PRIMARY MALIGNANT NEOPLASM OF LEFT LOWER LOBE OF LUNG (HCC): Status: ACTIVE | Noted: 2025-06-26

## 2025-06-27 ENCOUNTER — HOSPITAL ENCOUNTER (OUTPATIENT)
Dept: RADIATION ONCOLOGY | Age: 85
Discharge: HOME | End: 2025-06-27
Payer: MEDICARE

## 2025-06-27 ENCOUNTER — CLINICAL DOCUMENTATION (OUTPATIENT)
Dept: CASE MANAGEMENT | Age: 85
End: 2025-06-27

## 2025-06-27 VITALS
SYSTOLIC BLOOD PRESSURE: 121 MMHG | RESPIRATION RATE: 18 BRPM | BODY MASS INDEX: 23.57 KG/M2 | DIASTOLIC BLOOD PRESSURE: 56 MMHG | OXYGEN SATURATION: 95 % | WEIGHT: 133 LBS | HEIGHT: 63 IN | HEART RATE: 72 BPM | TEMPERATURE: 98 F

## 2025-06-27 DIAGNOSIS — C34.32 PRIMARY MALIGNANT NEOPLASM OF LEFT LOWER LOBE OF LUNG (HCC): Primary | ICD-10-CM

## 2025-06-27 PROCEDURE — 99204 OFFICE O/P NEW MOD 45 MIN: CPT | Performed by: RADIOLOGY

## 2025-06-27 PROCEDURE — 99202 OFFICE O/P NEW SF 15 MIN: CPT | Performed by: RADIOLOGY

## 2025-06-27 NOTE — PROGRESS NOTES
Name: Natty Suarez  : 1940  MRN: [id number]    Oncology Navigation- Initial Note:    Intake-  Contact Type: Radiation Oncology  Friend(neighbor) Del (medical POA) accompanied consultation     Diagnosis: Thoracic- malignant   NSCLC-Adenocarcinoma ( stage unknown awaiting MRI abdomen -adrenal nodule)     Home Disposition: Lives alone-neighbor assists  -perform ADL -shower/dresses/meals prepare self or neighbor/drives   Would like assistance with light house cleaning/laundry (difficulty finding agency to help   -retired RN   -still smoking/1/2 pack increase from 3 cigs in past 3 weeks( started age 23 -1 pack daily-decrease amt 15 years ago     Patient needs and barriers to care: Coordination of Care, Knowledge deficit, Practical needs/ Family problems (housing/ living alone), and Symptom Management     Referral Source: Outpatient    Receptive to Advanced Care Planning/ Palliative Care:  deferred    Interventions-              Oncologist Plan of Care:                 -MD discuss disease pathology                -unable to stage/ needing MRI abdomen first (25)                -discuss in tumor board                -keep Med Onc apt 2025                -contact pt after scan complete                                 Education ashok reiterated RAD ONC POC                 Information given [facility] reimbursement     General Interventions: Introduce myself to patient & friend as a nurse navigator- explained the role & process of nurse navigation. Welcome folder reviewed & given;                                                      including contact information.         Referrals: N/A     Biopsy site status: complete     Continuum of Care: Diagnosis/Active Treatment    Notes: Ashok following to assist & support as needed     Electronically signed by Olinda Marcus RN on 2025 at 2:09 PM

## 2025-06-27 NOTE — PROGRESS NOTES
[facility]           [address], [address]        [place] [id number]        O: [phone]        F: [phone]       [employer]            INITIAL CONSULTATION    Date of Service: 2025  Patient ID: Natty Suarez   : 1940  MRN: [id number]   Acct Number: [id number]         Requesting Provider: LOC AVILES-YULIA  Reason for request: Evaluation for the potential role of definitive or palliative radiation therapy.     CONSULTANT: Mahesh Saul MD MS    CHIEF COMPLAINT: Evaluation for the potential role of definitive or palliative radiation therapy.    ASSESSMENT:   Cancer Staging   Primary malignant neoplasm of left lower lobe of lung (HCC)  Staging form: Lung, AJCC V9  - Clinical stage from 2025: cT1c - Unsigned      PLAN:  With regards to radiation to the lung, I discussed the possible short-term side effects of skin irritation (causing redness, dryness, or peeling), tiredness, low blood counts (causing infection or bleeding), inflammation of the lungs (causing shortness of breath and cough), trouble/pain with swallowing and hair loss in treated area.  Possible long-term side effects discussed included hyperpigmentation of the skin, scarring of the lungs (causing shortness of breath and cough), damage to the nerves (causing numbness, weakness, or paralysis) and damage to the heart.    Natty Suarez presents today for regular scheduled consultation with regards to potential role of radiation therapy.  We discussed the patient's most recent radiographic and pathologic findings indicating non-small cell lung carcinoma, adenocarcinoma.  We discussed results of PET/CT imaging which demonstrated disease in the left lower lobe of the lung as well as suspicious lesions in the bilateral yaw as well as left adrenal gland.  We discussed the most recent pathologic results from EBUS with biopsy which was positive for disease in the

## 2025-06-30 ENCOUNTER — TELEPHONE (OUTPATIENT)
Dept: RADIATION ONCOLOGY | Age: 85
End: 2025-06-30

## 2025-06-30 ENCOUNTER — SOCIAL WORK (OUTPATIENT)
Dept: INFUSION THERAPY | Age: 85
End: 2025-06-30

## 2025-06-30 NOTE — TELEPHONE ENCOUNTER
Nutrition Assessment    Reason for Visit: High risk nutrition screen    Nutrition Recommendations:     -Reach out to Dietitian if needing support.      Malnutrition Assessment:   Malnutrition Status: no significant malnutrition  Context:  Findings of the 6 clinical characteristics of malnutrition (minimum of 2 out of 6 clinical characteristics is required to make the dx of moderate or severe Protein Calorie Malnutrition based on AND/ASPEN Guidelines):   1. Energy Intake: Not assessed   2. Weight Loss: 11% in 6 mos   3. Fat Loss: Not assessed   4. Muscle Loss: Not assessed   5. Fluid Accumulation: none   6.  Strength: Not measured      Nutrition Assessment:   History: Thyroid disease, HTN, HLD, COPD, CHF, CAD, Lung Cancer  Subjective: Called this pt on her phone. Pt stated she did not sleep well last night. Pt stated she did not want help from the Dietitian at this time. Pt had no other questions. Offered my assistance as needed and phone number to contact.   Current Nutrition:   Oral Diet: Regular   Oral Diet Intake:  Not assessed    Oral Nutrition Supplement (ONS): Not assessed   ONS intake:    N/A  Anthropometric Measures:     Wt Readings from Last 20 Encounters:   06/27/25 60.3 kg (133 lb)   06/24/25 60.1 kg (132 lb 6.4 oz)   06/16/25 61.2 kg (135 lb)   06/11/25 60.6 kg (133 lb 9.6 oz)   12/15/24 70.4 kg (155 lb 3.3 oz)       Usual Weight:   155# ([facility] 12/15/24)   Ideal Weight: 115#  Adjusted BW:  n/a    BMI:   23.56    Symptoms  Anorexia: Low appetite  Nausea: None  Vomiting: None  Dyspepsia/Heartburn: None  Dysphagia/Esophagitis: None  Taste changes: Not assessed  Dry Mouth: Not Assessed  Bowel Movements: Not assessed    Comparative Standards:      Estimated daily calorie needs:   1840kcal   30kcal/kg   Estimated daily protein needs:   73g  (1.2g/kg)   Estimated daily fluid needs:   1840ml    (1ml/kcal)    Nutrition Diagnosis:   Problem: Increased nutrient needs  Etiology: Lung Cancer  Signs and

## 2025-07-01 ENCOUNTER — TELEPHONE (OUTPATIENT)
Dept: ONCOLOGY | Age: 85
End: 2025-07-01

## 2025-07-01 DIAGNOSIS — Z91.89 ADJUSTMENT TO LIFE THREATENING ILLNESS: Primary | ICD-10-CM

## 2025-07-01 NOTE — TELEPHONE ENCOUNTER
Oncology Social Work     Date: 6/30/2025  Time: 4:05 PM  Name: Natty Suarez  MRN: [id number]      Contact Type: Distress Tool Follow-up     Note:   Situation: This staff called Natty Suarez via phone support to introduce myself as the Oncology Social Worker.      Background: Natty was referred to this staff by the Pulmonology Dept after a recent appointment here. This staff was calling to review the Distress Thermometer provided during their visit.     Coping Score 2     Areas of Concern Identified     Physical Concern: Sleep disturbance, Changes in appetite/weight, and Tobacco use / Substance use     Expressive Concern: Worry, anxiety or panic     Social Concern: None selected     Practical Concern: None selected     Existential Concern: None selected     Assessment: This staff had the opportunity to speak with Natty. She seemed pleasant as we discussed the call's purpose was to educate about the services provided by the . She had minimal concerns and shared she will be coming into the [facility] next week. She is also working with [facility] at this time.  - The opportunity to review her Advance Directive was offered. She pointed out she wants her new POA doc filed asap as she does not want [employer] as her agent. In order to make the change I need her to submit a current POA for me to  file. She expressed understanding and will bring it to her next appt. She became tearful while discussing the change of agents since her existing POA had been her friend for 17+ years. .  - No community referrals were placed now because she is too early to know or understand what services she may need. Therefore, her referral services may be reconsidered should her needs regarding assistance change.     Recommendation: Follow-up will be initiated based on need.  provided my contact information and will remain available for support.          Gely Ibarra, MSW, LSW, ONC-C, ACHP-  Oncology Social Worker

## 2025-07-02 ENCOUNTER — SOCIAL WORK (OUTPATIENT)
Dept: RADIATION ONCOLOGY | Age: 85
End: 2025-07-02

## 2025-07-02 NOTE — PROGRESS NOTES
- Natty called this morning asking that I fax the [facility] a copy of her Pathology report and her Face sheet. They will use this information to determine their ability to assist her with mileage or transport assistance.  - Natty assured me she is capable of driving and would rather drive herself then rely on someone or some other agency. She had a bad experience waiting for them once back in 2019 (during the pandemic) and doesn't want to try them again. I encouraged her to reconsider since it could be a way for her to save her personal funds. Again, she refused.  -I also pointed out to her that she may have a transportation assistance as part of her Medicaid benefit plan and to possible call them as well. Again, she refused and only wants gas card reimbursement.   - The information requested was faxed to [facility] @ [phone] (number provided by Natty)  - This staff will remain available for further assistance if needed.

## 2025-07-08 ENCOUNTER — OFFICE VISIT (OUTPATIENT)
Dept: ONCOLOGY | Age: 85
End: 2025-07-08
Payer: MEDICARE

## 2025-07-08 ENCOUNTER — CLINICAL DOCUMENTATION (OUTPATIENT)
Dept: CASE MANAGEMENT | Age: 85
End: 2025-07-08

## 2025-07-08 ENCOUNTER — HOSPITAL ENCOUNTER (OUTPATIENT)
Dept: INFUSION THERAPY | Age: 85
Discharge: HOME | End: 2025-07-08
Payer: MEDICARE

## 2025-07-08 ENCOUNTER — HOSPITAL ENCOUNTER (OUTPATIENT)
Dept: MRI IMAGING | Age: 85
Discharge: HOME | End: 2025-07-08
Payer: MEDICARE

## 2025-07-08 VITALS
HEIGHT: 63 IN | TEMPERATURE: 97.6 F | RESPIRATION RATE: 20 BRPM | OXYGEN SATURATION: 94 % | DIASTOLIC BLOOD PRESSURE: 64 MMHG | HEART RATE: 106 BPM | BODY MASS INDEX: 23.04 KG/M2 | WEIGHT: 130 LBS | SYSTOLIC BLOOD PRESSURE: 128 MMHG

## 2025-07-08 VITALS
OXYGEN SATURATION: 94 % | HEIGHT: 63 IN | TEMPERATURE: 97.6 F | SYSTOLIC BLOOD PRESSURE: 128 MMHG | DIASTOLIC BLOOD PRESSURE: 64 MMHG | WEIGHT: 130 LBS | BODY MASS INDEX: 23.04 KG/M2 | HEART RATE: 106 BPM | RESPIRATION RATE: 20 BRPM

## 2025-07-08 DIAGNOSIS — C34.32 PRIMARY MALIGNANT NEOPLASM OF LEFT LOWER LOBE OF LUNG (HCC): ICD-10-CM

## 2025-07-08 DIAGNOSIS — C34.92 NON-SMALL CELL CANCER OF LEFT LUNG (HCC): Primary | ICD-10-CM

## 2025-07-08 PROCEDURE — 1126F AMNT PAIN NOTED NONE PRSNT: CPT | Performed by: INTERNAL MEDICINE

## 2025-07-08 PROCEDURE — 99205 OFFICE O/P NEW HI 60 MIN: CPT | Performed by: INTERNAL MEDICINE

## 2025-07-08 PROCEDURE — 1090F PRES/ABSN URINE INCON ASSESS: CPT | Performed by: INTERNAL MEDICINE

## 2025-07-08 PROCEDURE — 1123F ACP DISCUSS/DSCN MKR DOCD: CPT | Performed by: INTERNAL MEDICINE

## 2025-07-08 PROCEDURE — A9579 GAD-BASE MR CONTRAST NOS,1ML: HCPCS

## 2025-07-08 PROCEDURE — 6360000004 HC RX CONTRAST MEDICATION

## 2025-07-08 PROCEDURE — 1159F MED LIST DOCD IN RCRD: CPT | Performed by: INTERNAL MEDICINE

## 2025-07-08 PROCEDURE — G8400 PT W/DXA NO RESULTS DOC: HCPCS | Performed by: INTERNAL MEDICINE

## 2025-07-08 PROCEDURE — 4004F PT TOBACCO SCREEN RCVD TLK: CPT | Performed by: INTERNAL MEDICINE

## 2025-07-08 PROCEDURE — G8427 DOCREV CUR MEDS BY ELIG CLIN: HCPCS | Performed by: INTERNAL MEDICINE

## 2025-07-08 PROCEDURE — 74183 MRI ABD W/O CNTR FLWD CNTR: CPT

## 2025-07-08 PROCEDURE — 99211 OFF/OP EST MAY X REQ PHY/QHP: CPT

## 2025-07-08 PROCEDURE — G8420 CALC BMI NORM PARAMETERS: HCPCS | Performed by: INTERNAL MEDICINE

## 2025-07-08 RX ORDER — GADOTERIDOL 279.3 MG/ML
10 INJECTION INTRAVENOUS
Status: COMPLETED | OUTPATIENT
Start: 2025-07-08 | End: 2025-07-08

## 2025-07-08 RX ADMIN — GADOTERIDOL 10 ML: 279.3 INJECTION, SOLUTION INTRAVENOUS at 16:13

## 2025-07-08 NOTE — PROGRESS NOTES
Name: Natty Suarez  : 1940  MRN: [id number]    Oncology Navigation Follow-Up Note    Contact Type:  Medical Oncology    Subjective: New consult with Dr. Awada    Objective: Discuss DX & recommendations for tx.    Oncology Plan of Care:    -ONC discussed dx & tx option overview, pending MRI results.  -FU with Rad ONC after MRI completed for definitive XRT, followed by surveillance.  -Return  at 330p to discuss MRI & define tx options for care.    -Pt may call ирина tomorrow afternoon if she desires for the results.      Referrals: Rad ONC FU is needed    Plan of Care Reviewed / Education:  reiterated    Assistance Needed: denies    Receptive to Advanced Care Planning / Palliative Care:  deferred today    Notes: Navigator is following to assist & support as needed.  [facility] Nurse Navigator returns tomorrow.    Electronically signed by Ileana Bates RN on 2025 at 2:30 PM

## 2025-07-08 NOTE — PROGRESS NOTES
[facility] [place] [address], [address]  [facility] [address]  Dept: [phone]  Dept Fax: [phone] Loc: [phone]      Visit Date:7/8/2025     Natty Suarez is a 84 y.o. female who presents today for:   No chief complaint on file.       HPI:   Natty Suarez is a 84 y.o. female with past medical history of hypertrophic cardiomegaly, hyperlipidemia, hypertension, COPD, CHF, arthritis, asthma, coronary artery disease, pneumonia, and thyroid disease who presents to clinic for management of non-small cell lung cancer.    Oncology History Overview Note   HISTORY:    6/11/25 According to Oralia AVILES-CNP note,  \"The patient is an 84-year-old female here for a new patient evaluation for a lung nodule. She has a past medical history of hypertrophic cardiomegaly, hyperlipidemia, hypertension, COPD, CHF, arthritis, asthma, coronary artery disease, pneumonia, and thyroid disease. The patient was hospitalized at an outside hospital on 05/04/2025 with hemoptysis & pneumonia. During her workup, she was found to have an abnormal lung nodule and was referred to [facility]. She was evaluated by the interventional pulmonary clinic at [facility] on 06/03/2025 for a 2.1 x 1.5 cm left lower lobe nodule and mediastinal lymphadenopathy. She also has a left adrenal gland nodule with an SUV of 3.2. At her visit with [facility], she was advised to have the adrenal gland nodule biopsied first, and if negative for malignancy, then the lung nodule should be biopsied. Of note, the patient did have pneumonia when she was hospitalized in 05/2025. She also had bilateral pleural effusions. The patient wished to follow up locally for her care and thus was referred to our pulmonary clinic. [facility] did order the adrenal gland biopsy.      The patient reports a cough with clear sputum production. She does not endorse hemoptysis, chest tightness, shortness of breath, or wheezing. She is taking albuterol

## 2025-07-09 ENCOUNTER — CASE MANAGEMENT (OUTPATIENT)
Dept: CASE MANAGEMENT | Age: 85
End: 2025-07-09

## 2025-07-09 NOTE — PROGRESS NOTES
Name: Natty Suarez  : 1940  MRN: [id number]    Oncology Navigation Follow-Up Note    Contact Type:  Telephone    Notes: ashok showed MRI abdomen results to Dr. Awada -MD to call pt.     Ashok contacted radiation department regarding results/pt needing f/u apt  Pt called ashok-informed spoke to Dr Awada- no chemo needed only radiation.  Ashok informed radiation will be contacting her for an appt. Voiced understanding.       Electronically signed by Olinda Marcus RN on 2025 at 2:26 PM

## 2025-07-22 ENCOUNTER — HOSPITAL ENCOUNTER (OUTPATIENT)
Dept: PULMONOLOGY | Age: 85
Discharge: HOME | End: 2025-07-22
Payer: MEDICARE

## 2025-07-22 DIAGNOSIS — R06.09 DYSPNEA ON EXERTION: ICD-10-CM

## 2025-07-22 DIAGNOSIS — F17.210 CIGARETTE NICOTINE DEPENDENCE WITHOUT COMPLICATION: ICD-10-CM

## 2025-07-22 PROCEDURE — 94060 EVALUATION OF WHEEZING: CPT

## 2025-07-22 PROCEDURE — 94726 PLETHYSMOGRAPHY LUNG VOLUMES: CPT

## 2025-07-22 PROCEDURE — 94729 DIFFUSING CAPACITY: CPT

## 2025-07-25 ENCOUNTER — HOSPITAL ENCOUNTER (OUTPATIENT)
Dept: RADIATION ONCOLOGY | Age: 85
Discharge: HOME | End: 2025-07-25
Payer: MEDICARE

## 2025-07-25 ENCOUNTER — HOSPITAL ENCOUNTER (OUTPATIENT)
Dept: CT IMAGING | Age: 85
Discharge: HOME | End: 2025-07-25

## 2025-07-25 VITALS
SYSTOLIC BLOOD PRESSURE: 132 MMHG | TEMPERATURE: 98 F | HEART RATE: 86 BPM | DIASTOLIC BLOOD PRESSURE: 61 MMHG | RESPIRATION RATE: 18 BRPM | OXYGEN SATURATION: 92 % | WEIGHT: 133 LBS | BODY MASS INDEX: 23.57 KG/M2

## 2025-07-25 DIAGNOSIS — C34.32 MALIGNANT NEOPLASM OF LOWER LOBE, LEFT BRONCHUS OR LUNG (HCC): ICD-10-CM

## 2025-07-25 PROCEDURE — 77332 RADIATION TREATMENT AID(S): CPT | Performed by: RADIOLOGY

## 2025-07-25 PROCEDURE — 77470 SPECIAL RADIATION TREATMENT: CPT | Performed by: RADIOLOGY

## 2025-07-25 PROCEDURE — 3209999900 CT GUIDE RADIATION THERAPY NO CHARGE

## 2025-07-25 PROCEDURE — 77334 RADIATION TREATMENT AID(S): CPT | Performed by: RADIOLOGY

## 2025-07-25 ASSESSMENT — ENCOUNTER SYMPTOMS
SORE THROAT: 0
APNEA: 0
BLOOD IN STOOL: 0
NAUSEA: 0
BACK PAIN: 0
TROUBLE SWALLOWING: 0
WHEEZING: 0
VOMITING: 0
ABDOMINAL PAIN: 0
SHORTNESS OF BREATH: 0
COUGH: 1
CHEST TIGHTNESS: 0

## 2025-07-25 NOTE — PROGRESS NOTES
[facility]  [address] [place] [address]  Phone: [phone]   Toll Free: [phone]   Fax: [phone]    RADIATION ONCOLOGY FOLLOW UP REPORT    PATIENT NAME:  Natty Suarez              : 1940  MEDICAL RECORD NO: [id number]    LOCATION: Radiation Oncology  [facility] NO: [id number]      PROVIDER: Patricia Pickard PhD, MD    DATE OF SERVICE: 2025      FOLLOW UP PHYSICIANS: Dr. Mandeep Gomez (PulmMed) LOC BOYCE(Pulm);  Dr. DOMINIQUE Garcia ([facility])       DIAGNOSIS: ***  Date of diagnosis: ***      ASSESSMENT:  ***      PLAN:  ***      RADIATION THERAPY TREATMENT HISTORY: ***      CHAPERONE: ***      HISTORY OF PRESENT ILLNESS: ***      INTERVAL HISTORY: ***    Past Medical History:   Diagnosis Date    Adenocarcinoma of left lung (HCC) 2025    Arthritis     Asthma     CAD (coronary artery disease)     Cerebral artery occlusion with cerebral infarction (HCC)     CHF (congestive heart failure) (HCC)     COPD (chronic obstructive pulmonary disease) (HCC)     Hyperlipidemia     Hypertension     Hypertrophic cardiomegaly     Pneumonia     Thyroid disease        Past Surgical History:   Procedure Laterality Date    APPENDECTOMY      BRONCHOSCOPY N/A 2025    ROBOTIC BRONCHOSCOPY W/CONE BEAM CT EBUS performed by Mandeep Gomez DO at [facility] ENDOSCOPY    BRONCHOSCOPY  2025    BRONCHOSCOPY ENDOBRONCHIAL ULTRASOUND FINE NEEDLE ASPIRATION performed by Mandeep Gomez DO at [facility] ENDOSCOPY    HYSTERECTOMY (CERVIX STATUS UNKNOWN)      IR VERTEBROPLASTY LUMBOSACRAL  2024    IR VERTEBROPLASTY LUMBOSACRAL 2024 [facility] SPECIAL PROCEDURES       MEDICATIONS:   Current Outpatient Medications   Medication Sig Dispense Refill    fluticasone (FLONASE) 50 MCG/ACT nasal spray 1 spray by Each Nostril route daily as needed for Rhinitis      nitroGLYCERIN (NITRO-DUR) 0.4 MG/HR Place 1 patch onto the skin as needed      traMADol

## 2025-07-28 ENCOUNTER — OFFICE VISIT (OUTPATIENT)
Dept: PULMONOLOGY | Age: 85
End: 2025-07-28
Payer: MEDICARE

## 2025-07-28 VITALS
RESPIRATION RATE: 18 BRPM | DIASTOLIC BLOOD PRESSURE: 70 MMHG | OXYGEN SATURATION: 96 % | SYSTOLIC BLOOD PRESSURE: 134 MMHG | WEIGHT: 130 LBS | BODY MASS INDEX: 23.92 KG/M2 | HEART RATE: 82 BPM | HEIGHT: 62 IN | TEMPERATURE: 97.3 F

## 2025-07-28 DIAGNOSIS — C34.90 NON-SMALL CELL LUNG CANCER, UNSPECIFIED LATERALITY (HCC): Primary | ICD-10-CM

## 2025-07-28 DIAGNOSIS — Z87.891 PERSONAL HISTORY OF TOBACCO USE, PRESENTING HAZARDS TO HEALTH: ICD-10-CM

## 2025-07-28 DIAGNOSIS — J44.9 STAGE 1 MILD COPD BY GOLD CLASSIFICATION (HCC): ICD-10-CM

## 2025-07-28 PROCEDURE — 4004F PT TOBACCO SCREEN RCVD TLK: CPT

## 2025-07-28 PROCEDURE — 1090F PRES/ABSN URINE INCON ASSESS: CPT

## 2025-07-28 PROCEDURE — G8400 PT W/DXA NO RESULTS DOC: HCPCS

## 2025-07-28 PROCEDURE — 3023F SPIROM DOC REV: CPT

## 2025-07-28 PROCEDURE — 99406 BEHAV CHNG SMOKING 3-10 MIN: CPT

## 2025-07-28 PROCEDURE — 99214 OFFICE O/P EST MOD 30 MIN: CPT

## 2025-07-28 PROCEDURE — G8427 DOCREV CUR MEDS BY ELIG CLIN: HCPCS

## 2025-07-28 PROCEDURE — G8420 CALC BMI NORM PARAMETERS: HCPCS

## 2025-07-28 PROCEDURE — 1123F ACP DISCUSS/DSCN MKR DOCD: CPT

## 2025-07-28 PROCEDURE — 1159F MED LIST DOCD IN RCRD: CPT

## 2025-07-28 RX ORDER — POTASSIUM CHLORIDE 1.5 G/1.58G
20 POWDER, FOR SOLUTION ORAL DAILY
COMMUNITY

## 2025-07-28 RX ORDER — AZITHROMYCIN 250 MG/1
TABLET, FILM COATED ORAL
COMMUNITY

## 2025-07-28 RX ORDER — BIOTIN 1 MG
1000 TABLET ORAL DAILY
COMMUNITY

## 2025-07-28 RX ORDER — CYCLOBENZAPRINE HCL 10 MG
10 TABLET ORAL 3 TIMES DAILY
COMMUNITY
Start: 2025-01-02

## 2025-07-28 RX ORDER — ASCORBIC ACID 500 MG
500 TABLET ORAL DAILY
COMMUNITY

## 2025-07-28 ASSESSMENT — ENCOUNTER SYMPTOMS
SORE THROAT: 0
COUGH: 1
RHINORRHEA: 1
WHEEZING: 0
CHEST TIGHTNESS: 0
SHORTNESS OF BREATH: 1

## 2025-07-28 NOTE — PROGRESS NOTES
[facility]     Patient: NATTY DUDLEY, 84 y.o.   : 1940  Date of encounter: 2025   Previously seen by Oralia Barber CNP     Subjective     Patient presents for 1 month Cough  follow up   Natty presents to the pulmonary clinic with her POA today with no acute complaints or concerns, though inquires of her most recent pulmonary function testing. She reports no significant change in her shortness of breath otherwise- typically experiences shortness of breath with exertion, specifically long walks. She denies associated wheezing, but does report a chronic intermittent cough productive of clear sputum. She denies hemoptysis, chest pain, chest tightness. Reports she currently uses albuterol 2 puffs BID, generally morning and night; is not currently prescribed any maintenance inhaler therapy. Admits to smoking 7-8 cigarettes per day, is trying to cut back by her own will power.   Per chart review Natty is an 83 y/o female smoker who follows with the pulmonary clinic for chronic cough. She has a pertinent history of Left lower lobe adenocarcinoma per transbronchial biopsy, has recently established care with oncology who has diagnosed her with Non small cell lung cancer. She is aware of this diagnosis, and reports is awaiting follow up with radiology oncology to start treatments. Has never been tested for A1A def.      Progress History:   - Since last visit any new medical issues? No  - New ER or hospital visits? No  - Any new or changes in medicines? no  - Using inhalers? Yes  - Are they helpful? Yes      Immunization History   Administered Date(s) Administered    Influenza Virus Vaccine 10/22/2014, 10/22/2014    Influenza, FLUAD, (age 65 y+), IM, Trivalent PF, 0.5mL 2019    Influenza, FLUZONE High Dose (age 65 y+), IM, Quadv, 0.7mL 10/05/2021, 11/15/2022, 10/04/2023    Influenza, FLUZONE High Dose, (age 65 y+), IM, Trivalent PF, 0.5mL 2017, 2018,

## 2025-07-29 ENCOUNTER — TELEPHONE (OUTPATIENT)
Age: 85
End: 2025-07-29

## 2025-07-29 NOTE — TELEPHONE ENCOUNTER
Received a call stating that the patients preferred medication is stiolto instead of the bevespi due to the pt not trying and failing. Please advise thank you

## 2025-07-29 NOTE — PATIENT INSTRUCTIONS
Learning About Benefits of Quitting Smoking  Quitting smoking helps your body in many ways. Quitting lowers your risk for cancer, lung disease, heart attack, stroke, blood vessel disease, and blindness. You'll also get sick less often and heal faster. And after you quit, you may find that your mood is better and you are less stressed.  When and how will you feel healthier after quitting smoking?        In the first hours or days:   Your blood pressure and heart rate go down.  Your oxygen levels increase.        Within weeks or months:   You will cough less and breathe deeper. It may be easier to be active.  Your sense of taste and smell should return.        Over the years:   Your risks of heart disease, heart attack, and stroke are lower.  Your risk of lung cancer is cut by about half after about 10 years. And your risk for other cancers is lower too.  How would quitting help others in your life?    Their heart, lung, and cancer risks may drop, much like yours. They will also be sick less.   If you are or will be pregnant someday, quitting smoking means a healthier .   Where can you learn more?  Go to https://www.[employer].net/patientEd and enter O319 to learn more about \"Learning About Benefits of Quitting Smoking.\"  Current as of: 2024  Content Version: 14.5   eTask.it.   Care instructions adapted under license by [employer]. If you have questions about a medical condition or this instruction, always ask your healthcare professional. [employer] disclaims any warranty or liability for your use of this information.

## 2025-07-30 NOTE — TELEPHONE ENCOUNTER
Received notification from office staff that pt must try and fail Stiolto prior to starting Bevespi.     New prescription for Stiolto ordered.       Electronically signed by Kimber Jean-Baptiste PA-C on 7/30/2025 at 12:33 PM

## 2025-08-01 ENCOUNTER — CLINICAL DOCUMENTATION (OUTPATIENT)
Dept: SPIRITUAL SERVICES | Facility: CLINIC | Age: 85
End: 2025-08-01

## 2025-08-01 NOTE — FLOWSHEET NOTE
Spiritual Health Attempted Visit Note  [facility]      Room # Room/bed info not found    Name: Natty Suarez           Age: 84 y.o.    Gender: female          MRN: <[id number]>  Episcopal: Non-Anabaptist       Preferred Language: English      Date: 08/01/25  Visit Time: Begin Time: (P) 0830 End Time : (P) 0835 Complexity of Encounter: (P) Low      Visit Summary:  attempted to call the patient and they did not answer. A phone message was left.       Referral/Consult From: (P)  (Oncology)  Encounter Overview/Reason: (P) Attempted Encounter  Encounter Code:     Crisis (if applicable):    Service Provided For: (P) Patient not available     Patient was not available. Patient was working with other staff/was asleep/ indisposed.  will follow-up at a later time.          Electronically signed by Rev. Ralph Subramanian MDiv     [facility]    To reach a  please call [phone]

## 2025-08-13 ENCOUNTER — HOSPITAL ENCOUNTER (OUTPATIENT)
Dept: RADIATION ONCOLOGY | Age: 85
Discharge: HOME | End: 2025-08-13
Payer: MEDICARE

## 2025-08-13 PROCEDURE — 77300 RADIATION THERAPY DOSE PLAN: CPT | Performed by: RADIOLOGY

## 2025-08-13 PROCEDURE — 77301 RADIOTHERAPY DOSE PLAN IMRT: CPT | Performed by: RADIOLOGY

## 2025-08-13 PROCEDURE — 77293 RESPIRATOR MOTION MGMT SIMUL: CPT | Performed by: RADIOLOGY

## 2025-08-26 ENCOUNTER — APPOINTMENT (OUTPATIENT)
Dept: RADIATION ONCOLOGY | Age: 85
End: 2025-08-26
Payer: MEDICARE

## 2025-08-27 ENCOUNTER — HOSPITAL ENCOUNTER (OUTPATIENT)
Dept: RADIATION ONCOLOGY | Age: 85
Discharge: HOME | End: 2025-08-27
Payer: MEDICARE

## 2025-08-27 ENCOUNTER — SOCIAL WORK (OUTPATIENT)
Dept: RADIATION ONCOLOGY | Age: 85
End: 2025-08-27

## 2025-08-27 PROCEDURE — 77386 HC NTSTY MODUL RAD TX DLVR CPLX: CPT | Performed by: RADIOLOGY

## 2025-08-27 PROCEDURE — 77014 CHG CT GUIDANCE RADIATION THERAPY FLDS PLACEMENT: CPT | Performed by: RADIOLOGY

## 2025-08-28 ENCOUNTER — HOSPITAL ENCOUNTER (OUTPATIENT)
Dept: RADIATION ONCOLOGY | Age: 85
Discharge: HOME | End: 2025-08-28
Payer: MEDICARE

## 2025-08-28 VITALS
HEART RATE: 74 BPM | RESPIRATION RATE: 18 BRPM | SYSTOLIC BLOOD PRESSURE: 136 MMHG | WEIGHT: 126 LBS | OXYGEN SATURATION: 95 % | TEMPERATURE: 97.8 F | BODY MASS INDEX: 23.05 KG/M2 | DIASTOLIC BLOOD PRESSURE: 64 MMHG

## 2025-08-28 PROCEDURE — 77386 HC NTSTY MODUL RAD TX DLVR CPLX: CPT | Performed by: RADIOLOGY

## 2025-08-28 ASSESSMENT — PAIN SCALES - GENERAL: PAINLEVEL_OUTOF10: 0

## 2025-08-29 ENCOUNTER — HOSPITAL ENCOUNTER (OUTPATIENT)
Dept: RADIATION ONCOLOGY | Age: 85
Discharge: HOME | End: 2025-08-29
Payer: MEDICARE

## 2025-08-29 PROCEDURE — 77386 HC NTSTY MODUL RAD TX DLVR CPLX: CPT | Performed by: RADIOLOGY

## 2025-09-02 ENCOUNTER — HOSPITAL ENCOUNTER (OUTPATIENT)
Dept: RADIATION ONCOLOGY | Age: 85
Discharge: HOME | End: 2025-09-02
Payer: MEDICARE

## 2025-09-02 PROCEDURE — 77386 HC NTSTY MODUL RAD TX DLVR CPLX: CPT | Performed by: RADIOLOGY

## 2025-09-03 ENCOUNTER — HOSPITAL ENCOUNTER (OUTPATIENT)
Dept: RADIATION ONCOLOGY | Age: 85
Discharge: HOME | End: 2025-09-03
Payer: MEDICARE

## 2025-09-03 PROCEDURE — 77386 HC NTSTY MODUL RAD TX DLVR CPLX: CPT | Performed by: RADIOLOGY

## 2025-09-04 ENCOUNTER — HOSPITAL ENCOUNTER (OUTPATIENT)
Dept: RADIATION ONCOLOGY | Age: 85
Discharge: HOME | End: 2025-09-04
Payer: MEDICARE

## 2025-09-04 VITALS
OXYGEN SATURATION: 96 % | SYSTOLIC BLOOD PRESSURE: 121 MMHG | BODY MASS INDEX: 22.66 KG/M2 | HEART RATE: 76 BPM | RESPIRATION RATE: 16 BRPM | WEIGHT: 123.9 LBS | DIASTOLIC BLOOD PRESSURE: 58 MMHG | TEMPERATURE: 98.5 F

## 2025-09-04 PROCEDURE — 77386 HC NTSTY MODUL RAD TX DLVR CPLX: CPT | Performed by: RADIOLOGY

## 2025-09-04 ASSESSMENT — PAIN SCALES - GENERAL: PAINLEVEL_OUTOF10: 5

## 2025-09-04 ASSESSMENT — PAIN DESCRIPTION - LOCATION: LOCATION: LEG

## 2025-09-05 ENCOUNTER — HOSPITAL ENCOUNTER (OUTPATIENT)
Dept: RADIATION ONCOLOGY | Age: 85
Discharge: HOME | End: 2025-09-05
Payer: MEDICARE

## 2025-09-05 PROCEDURE — 77386 HC NTSTY MODUL RAD TX DLVR CPLX: CPT | Performed by: RADIOLOGY

## (undated) DEVICE — SWIVEL CONNECTOR

## (undated) DEVICE — NEEDLE ASPIR 21GA L700MM US GUID TREAT DST END FOR EFFICIENT

## (undated) DEVICE — BIOPSY NEEDLE, 19G: Brand: FLEXISION

## (undated) DEVICE — VISION PROBE ADAPTER AND SUCTION ADAPTER

## (undated) DEVICE — FORCEPS BX WRK L110MM CHN L2MM DIA1.7MM SUPERDIMENSION